# Patient Record
Sex: FEMALE | Race: WHITE | NOT HISPANIC OR LATINO | Employment: UNEMPLOYED | URBAN - METROPOLITAN AREA
[De-identification: names, ages, dates, MRNs, and addresses within clinical notes are randomized per-mention and may not be internally consistent; named-entity substitution may affect disease eponyms.]

---

## 2021-03-13 ENCOUNTER — IMMUNIZATION (OUTPATIENT)
Dept: VACCINE CLINIC | Facility: HOSPITAL | Age: 31
End: 2021-03-13

## 2021-03-13 PROCEDURE — 91300 HC SARSCOV02 VAC 30MCG/0.3ML IM: CPT | Performed by: INTERNAL MEDICINE

## 2021-03-13 PROCEDURE — 0001A: CPT | Performed by: INTERNAL MEDICINE

## 2021-04-03 ENCOUNTER — IMMUNIZATION (OUTPATIENT)
Dept: VACCINE CLINIC | Facility: HOSPITAL | Age: 31
End: 2021-04-03

## 2021-04-03 PROCEDURE — 91300 HC SARSCOV02 VAC 30MCG/0.3ML IM: CPT | Performed by: INTERNAL MEDICINE

## 2021-04-03 PROCEDURE — 0002A: CPT | Performed by: INTERNAL MEDICINE

## 2021-08-09 ENCOUNTER — OFFICE VISIT (OUTPATIENT)
Dept: INTERNAL MEDICINE | Facility: CLINIC | Age: 31
End: 2021-08-09

## 2021-08-09 VITALS
DIASTOLIC BLOOD PRESSURE: 76 MMHG | RESPIRATION RATE: 16 BRPM | TEMPERATURE: 96.4 F | OXYGEN SATURATION: 97 % | BODY MASS INDEX: 26.52 KG/M2 | HEIGHT: 62 IN | HEART RATE: 87 BPM | SYSTOLIC BLOOD PRESSURE: 122 MMHG

## 2021-08-09 DIAGNOSIS — N92.6 IRREGULAR PERIODS: ICD-10-CM

## 2021-08-09 DIAGNOSIS — L73.9 FOLLICULITIS: ICD-10-CM

## 2021-08-09 DIAGNOSIS — J01.40 ACUTE NON-RECURRENT PANSINUSITIS: Primary | ICD-10-CM

## 2021-08-09 DIAGNOSIS — R03.0 ELEVATED BLOOD PRESSURE, SITUATIONAL: ICD-10-CM

## 2021-08-09 PROCEDURE — 99204 OFFICE O/P NEW MOD 45 MIN: CPT | Performed by: INTERNAL MEDICINE

## 2021-08-09 RX ORDER — TRIAMCINOLONE ACETONIDE 0.25 MG/G
OINTMENT TOPICAL 2 TIMES DAILY
Qty: 80 G | Refills: 1 | Status: ON HOLD | OUTPATIENT
Start: 2021-08-09 | End: 2022-10-14

## 2021-08-09 NOTE — PROGRESS NOTES
"Chief Complaint  Cough, Hypertension, and Acne    Subjective          Michelle Norwood presents to [facility] for cough and elevated BP. Started getting sick 2 weeks ago, daughter had been sick. Eventually went to [facility] [address] last week, COVID test was done and negative and she was diagnosed with sinus infection and placed her on augmentin. She has finished abx as of yesterday and overall feeling better other then mild cough.   She had BP values elevated when she was there and ws told to follow up on this with PCP. She did have a fever at the time she was seen.   Also with a pimple on her back that she wants evaluated. Has been present x 1 year. Very itchy if touched at all by her or her clothes.     Objective   Vital Signs:     /76   Pulse 87   Temp 96.4 °F (35.8 °C)   Resp 16   Ht 157.5 cm (62\")   SpO2 97%   BMI 26.52 kg/m²     Physical Exam  Vitals and nursing note reviewed.   Constitutional:       General: She is not in acute distress.     Appearance: Normal appearance.   Cardiovascular:      Rate and Rhythm: Normal rate and regular rhythm.      Pulses: Normal pulses.      Heart sounds: Normal heart sounds. No murmur heard.     Pulmonary:      Effort: Pulmonary effort is normal. No respiratory distress.      Breath sounds: Normal breath sounds.   Abdominal:      General: Abdomen is flat. Bowel sounds are normal.      Palpations: Abdomen is soft.      Tenderness: There is no abdominal tenderness.   Musculoskeletal:      Right lower leg: No edema.      Left lower leg: No edema.   Skin:     Comments: 3 0.5 cm raised papules, one on L flank, one on L lower abd, one over lower L spine, all flesh colored other than one of L flank that is irritated and erythematous with overlying excoriation, no drainage   Neurological:      Mental Status: She is alert and oriented to person, place, and time. Mental status is at baseline.   Psychiatric:         Mood and Affect: Mood normal. "         Behavior: Behavior normal.          Result Review :   The following data was reviewed by: Maryam Hull MD on 08/09/2021:  Covid Tests    Common Labsle 8/1/21   COVID19 Not Detected      Comments are available for some flowsheets but are not being displayed.           Data reviewed: Consultant notes [facility] 5/31 and 8/1     Assessment and Plan      Diagnoses and all orders for this visit:    1. Acute non-recurrent pansinusitis (Primary)- RESOLVED    2. Elevated blood pressure, situational   - BP at [facility] elevated but normal in office today   - pt was febrile at time BP was taken and was also tachycardic, no further eval, will monitor with all subsequent office visits    3. Folliculitis  -     mupirocin (Bactroban) 2 % ointment; Apply  topically to the appropriate area as directed 3 (Three) Times a Day.  Dispense: 30 g; Refill: 2  -     triamcinolone (KENALOG) 0.025 % ointment; Apply  topically to the appropriate area as directed 2 (Two) Times a Day. For itching  Dispense: 80 g; Refill: 1    4. Irregular periods   - pt with Nexplanon in place, due for removal Feb 2022 with some notable cycle changes as this date approaches   - discussed tolerance vs early removal vs concurrent OCPs, for now pt will just continue to monitor and tolerate at home, but can call back to schedule early removal to allow periods to self regulate back to normal if symptoms persist or worsen    Follow Up   Return if symptoms worsen or fail to improve.    Patient was given instructions and counseling regarding her condition or for health maintenance advice. Please see specific information pulled into the AVS if appropriate.     Windy Hull MD  [facility]  Phone: [phone]  Fax: [phone]

## 2022-01-24 ENCOUNTER — OFFICE VISIT (OUTPATIENT)
Dept: INTERNAL MEDICINE | Facility: CLINIC | Age: 32
End: 2022-01-24

## 2022-01-24 VITALS
OXYGEN SATURATION: 100 % | TEMPERATURE: 97.1 F | DIASTOLIC BLOOD PRESSURE: 64 MMHG | WEIGHT: 140 LBS | RESPIRATION RATE: 16 BRPM | BODY MASS INDEX: 25.76 KG/M2 | HEART RATE: 83 BPM | SYSTOLIC BLOOD PRESSURE: 110 MMHG | HEIGHT: 62 IN

## 2022-01-24 DIAGNOSIS — Z30.46 NEXPLANON REMOVAL: Primary | ICD-10-CM

## 2022-01-24 DIAGNOSIS — Z30.46 FAMILY PLANNING, SUBDERMAL CONTRACEPTIVE CHECKING/REINSERTION/REMOVAL: ICD-10-CM

## 2022-01-24 DIAGNOSIS — Z31.69 ENCOUNTER FOR PRECONCEPTION CONSULTATION: ICD-10-CM

## 2022-01-24 PROCEDURE — 99212 OFFICE O/P EST SF 10 MIN: CPT | Performed by: INTERNAL MEDICINE

## 2022-01-24 PROCEDURE — 11982 REMOVE DRUG IMPLANT DEVICE: CPT | Performed by: INTERNAL MEDICINE

## 2022-01-24 NOTE — PROGRESS NOTES
"Chief Complaint  Procedure and Nexplanon Removal    Subjective          Michelle Norwood presents to [facility] for Nexplanon removal. Pt had placed in April 2019 for contraception and menstrual cycle regulation. Has noticed changes to her cycle over past couple months and she is also looking to get pregnant again later this winter/spring, to would like implant removed.     Objective   Vital Signs:     /64   Pulse 83   Temp 97.1 °F (36.2 °C)   Resp 16   Ht 157.5 cm (62\")   Wt 63.5 kg (140 lb)   SpO2 100%   BMI 25.61 kg/m²     Physical Exam  Vitals and nursing note reviewed.   Constitutional:       General: She is not in acute distress.     Appearance: Normal appearance.   Pulmonary:      Effort: Pulmonary effort is normal. No respiratory distress.   Skin:     Comments: Subdermal implant palpable in R upper arm, no overlying skin changes, tenderness, redness   Neurological:      Mental Status: She is alert and oriented to person, place, and time. Mental status is at baseline.   Psychiatric:         Mood and Affect: Mood normal.         Thought Content: Thought content normal.         Judgment: Judgment normal.          Result Review : : None     Assessment and Plan      Diagnoses and all orders for this visit:    1. Nexplanon removal (Primary)    2. Family planning, subdermal contraceptive checking/reinsertion/removal    3. Encounter for preconception consultation    Nexplanon Removal Procedure Note    Removal    Date of Insertion:  Known, April 4, 2019  Date of Removal:  January 24, 2022    Information related to removal of the implant:   Reason(s) for removal:  Desire pregnancy  Was implant palpable before removal?  Yes    Procedure Time Out Documentation  The risks of the procedure were reviewed with the patient including bleeding, infection and unlikely damage to the insertion site and the benefits of the procedure were explained to the patient and Verbal " informed consent was obtained    Procedure:    Implant identified.  Right upper arm prepped with Betadinex3.  None, 1% lidocaine with epinephrine injected at planned incision site.  A vertical incision 3 mm was performed with scalpel at the distal end of implant.  The implant was removed using a hemostat. The implant was inspected and found to be intact and complete.  Steri strips and a pressure dressing were applied to the site.  After removal instructions were given and verbally reviewed with the patient who acknowledged her understanding.    Difficulties with the implant removal procedure?  no    Patient tolerated the procedure well without complications.     Discussed possible menstrual cycle irregularities over next 1-3 months, how to monitor return to regular cycle, ovulation monitoring for fertility tracking.   Prenatal counseling completed--> pt to start PNV daily. No current Rx medications that require discontinuation.   Encouraged increased fluid intake for improved hydration.   Pt is fully vaccinated against COVID.     Follow Up   Return if symptoms worsen or fail to improve.    Patient was given instructions and counseling regarding her condition or for health maintenance advice. Please see specific information pulled into the AVS if appropriate.     Windy Hull MD  [facility] [place] [facility]  Phone: [phone]  Fax: [phone]

## 2022-04-04 LAB
EXTERNAL HEPATITIS B SURFACE ANTIGEN: NEGATIVE
EXTERNAL RUBELLA QUALITATIVE: NORMAL
EXTERNAL SYPHILIS RPR SCREEN: NORMAL
HIV1 P24 AG SERPL QL IA: NORMAL

## 2022-10-13 ENCOUNTER — APPOINTMENT (OUTPATIENT)
Dept: ULTRASOUND IMAGING | Facility: HOSPITAL | Age: 32
End: 2022-10-13

## 2022-10-13 ENCOUNTER — HOSPITAL ENCOUNTER (INPATIENT)
Facility: HOSPITAL | Age: 32
LOS: 3 days | Discharge: HOME | End: 2022-10-16
Attending: OBSTETRICS & GYNECOLOGY | Admitting: OBSTETRICS & GYNECOLOGY

## 2022-10-13 PROBLEM — O42.90 PREMATURE RUPTURE OF MEMBRANES: Status: ACTIVE | Noted: 2022-10-13

## 2022-10-13 LAB
A1 MICROGLOB PLACENTAL VAG QL: POSITIVE
ABO GROUP BLD: NORMAL
BLD GP AB SCN SERPL QL: NEGATIVE
DEPRECATED RDW RBC AUTO: 38.4 FL (ref 37–54)
ERYTHROCYTE [DISTWIDTH] IN BLOOD BY AUTOMATED COUNT: 12.8 % (ref 12.3–15.4)
HCT VFR BLD AUTO: 34.5 % (ref 34–46.6)
HGB BLD-MCNC: 11.9 G/DL (ref 12–15.9)
MCH RBC QN AUTO: 28.5 PG (ref 26.6–33)
MCHC RBC AUTO-ENTMCNC: 34.5 G/DL (ref 31.5–35.7)
MCV RBC AUTO: 82.5 FL (ref 79–97)
PLATELET # BLD AUTO: 246 10*3/MM3 (ref 140–450)
PMV BLD AUTO: 9.2 FL (ref 6–12)
RBC # BLD AUTO: 4.18 10*6/MM3 (ref 3.77–5.28)
RH BLD: POSITIVE
T&S EXPIRATION DATE: NORMAL
WBC NRBC COR # BLD: 8.35 10*3/MM3 (ref 3.4–10.8)

## 2022-10-13 PROCEDURE — 25010000002 BETAMETHASONE ACET & SOD PHOS PER 4 MG: Performed by: OBSTETRICS & GYNECOLOGY

## 2022-10-13 PROCEDURE — 86900 BLOOD TYPING SEROLOGIC ABO: CPT | Performed by: OBSTETRICS & GYNECOLOGY

## 2022-10-13 PROCEDURE — 86901 BLOOD TYPING SEROLOGIC RH(D): CPT | Performed by: OBSTETRICS & GYNECOLOGY

## 2022-10-13 PROCEDURE — 86850 RBC ANTIBODY SCREEN: CPT | Performed by: OBSTETRICS & GYNECOLOGY

## 2022-10-13 PROCEDURE — 84112 EVAL AMNIOTIC FLUID PROTEIN: CPT | Performed by: OBSTETRICS & GYNECOLOGY

## 2022-10-13 PROCEDURE — 76805 OB US >/= 14 WKS SNGL FETUS: CPT | Performed by: OBSTETRICS & GYNECOLOGY

## 2022-10-13 PROCEDURE — 85027 COMPLETE CBC AUTOMATED: CPT | Performed by: OBSTETRICS & GYNECOLOGY

## 2022-10-13 PROCEDURE — 59025 FETAL NON-STRESS TEST: CPT

## 2022-10-13 PROCEDURE — 76819 FETAL BIOPHYS PROFIL W/O NST: CPT

## 2022-10-13 PROCEDURE — 76819 FETAL BIOPHYS PROFIL W/O NST: CPT | Performed by: OBSTETRICS & GYNECOLOGY

## 2022-10-13 PROCEDURE — 76805 OB US >/= 14 WKS SNGL FETUS: CPT

## 2022-10-13 PROCEDURE — 25010000002 AMPICILLIN PER 500 MG: Performed by: OBSTETRICS & GYNECOLOGY

## 2022-10-13 PROCEDURE — 99202 OFFICE O/P NEW SF 15 MIN: CPT | Performed by: OBSTETRICS & GYNECOLOGY

## 2022-10-13 RX ORDER — AZITHROMYCIN 250 MG/1
1000 TABLET, FILM COATED ORAL ONCE
Status: COMPLETED | OUTPATIENT
Start: 2022-10-13 | End: 2022-10-13

## 2022-10-13 RX ORDER — PRENATAL VIT NO.126/IRON/FOLIC 28MG-0.8MG
1 TABLET ORAL DAILY
COMMUNITY

## 2022-10-13 RX ORDER — BETAMETHASONE SODIUM PHOSPHATE AND BETAMETHASONE ACETATE 3; 3 MG/ML; MG/ML
12 INJECTION, SUSPENSION INTRA-ARTICULAR; INTRALESIONAL; INTRAMUSCULAR; SOFT TISSUE DAILY
Status: COMPLETED | OUTPATIENT
Start: 2022-10-13 | End: 2022-10-14

## 2022-10-13 RX ORDER — SODIUM CHLORIDE 0.9 % (FLUSH) 0.9 %
10 SYRINGE (ML) INJECTION AS NEEDED
Status: DISCONTINUED | OUTPATIENT
Start: 2022-10-13 | End: 2022-10-14 | Stop reason: HOSPADM

## 2022-10-13 RX ORDER — SODIUM CHLORIDE 0.9 % (FLUSH) 0.9 %
10 SYRINGE (ML) INJECTION EVERY 12 HOURS SCHEDULED
Status: DISCONTINUED | OUTPATIENT
Start: 2022-10-13 | End: 2022-10-14 | Stop reason: HOSPADM

## 2022-10-13 RX ORDER — SODIUM CHLORIDE, SODIUM LACTATE, POTASSIUM CHLORIDE, CALCIUM CHLORIDE 600; 310; 30; 20 MG/100ML; MG/100ML; MG/100ML; MG/100ML
125 INJECTION, SOLUTION INTRAVENOUS CONTINUOUS
Status: DISCONTINUED | OUTPATIENT
Start: 2022-10-13 | End: 2022-10-15

## 2022-10-13 RX ORDER — ASPIRIN 81 MG/1
81 TABLET, CHEWABLE ORAL DAILY
Status: ON HOLD | COMMUNITY
End: 2022-10-14

## 2022-10-13 RX ORDER — AMOXICILLIN 250 MG/1
500 CAPSULE ORAL EVERY 8 HOURS SCHEDULED
Status: DISCONTINUED | OUTPATIENT
Start: 2022-10-15 | End: 2022-10-14

## 2022-10-13 RX ADMIN — SODIUM CHLORIDE, POTASSIUM CHLORIDE, SODIUM LACTATE AND CALCIUM CHLORIDE 125 ML/HR: 600; 310; 30; 20 INJECTION, SOLUTION INTRAVENOUS at 19:32

## 2022-10-13 RX ADMIN — AMPICILLIN SODIUM 2 G: 2 INJECTION, POWDER, FOR SOLUTION INTRAMUSCULAR; INTRAVENOUS at 20:30

## 2022-10-13 RX ADMIN — AZITHROMYCIN DIHYDRATE 1000 MG: 250 TABLET, FILM COATED ORAL at 09:04

## 2022-10-13 RX ADMIN — SODIUM CHLORIDE, POTASSIUM CHLORIDE, SODIUM LACTATE AND CALCIUM CHLORIDE 125 ML/HR: 600; 310; 30; 20 INJECTION, SOLUTION INTRAVENOUS at 09:05

## 2022-10-13 RX ADMIN — AMPICILLIN SODIUM 2 G: 2 INJECTION, POWDER, FOR SOLUTION INTRAMUSCULAR; INTRAVENOUS at 15:08

## 2022-10-13 RX ADMIN — AMPICILLIN SODIUM 2 G: 2 INJECTION, POWDER, FOR SOLUTION INTRAMUSCULAR; INTRAVENOUS at 09:04

## 2022-10-13 RX ADMIN — BETAMETHASONE SODIUM PHOSPHATE AND BETAMETHASONE ACETATE 12 MG: 3; 3 INJECTION, SUSPENSION INTRA-ARTICULAR; INTRALESIONAL; INTRAMUSCULAR at 09:04

## 2022-10-13 NOTE — H&P
[facility]  Obstetric History and Physical    Patient Name: Michelle Norwood  :  1990  MRN:  [id number]        Chief Complaint   Patient presents with   • Rupture of Membranes     Mark-- pt states large gush of clear fluid around 0515. Pt states +Fm and mild spotting. and denies ctxs.        Subjective     Patient is a 32 y.o. female  currently at 33w4d, who presents after noting a large gush of fluid early this am. Seen and MARK and confirmed PPROM. Denies contractions.           Her prenatal care is significant for  1) Hx SGA     2) Prior C/S @ 39 wks for NRFHR     Past Medical History: Past Medical History:   Diagnosis Date   • Folliculitis 2021   • Irregular periods 2021      Past Surgical History Past Surgical History:   Procedure Laterality Date   •  SECTION N/A 10/25/2018   • WISDOM TOOTH EXTRACTION        Family History: History reviewed. No pertinent family history.   Social History:  reports that she has never smoked. She has never used smokeless tobacco.   reports that she does not currently use alcohol.   reports that she does not currently use drugs.    Allergies:     Patient has no known allergies.     Past OB History:       OB History    Para Term  AB Living   3 1 1 0 1 1   SAB IAB Ectopic Molar Multiple Live Births   1 0 0 0 0 1      # Outcome Date GA Lbr Marin/2nd Weight Sex Delivery Anes PTL Lv   3 Current            2 Term 10/25/18 38w5d  1814 g (4 lb) F CS-LTranv  N STEPHANI      Complications: Fetal Intolerance   1 2016     SAB            Objective       Vital Signs Range for the last 24 hours  Temperature: Temp:  [98.1 °F (36.7 °C)-98.4 °F (36.9 °C)] 98.1 °F (36.7 °C)   Temp Source: Temp src: Oral   BP: BP: (111-125)/(74-81) 111/74   Pulse: Heart Rate:  [107-108] 107   Respirations: Resp:  [16] 16             Physical Exam:        General :    Alert and cooperative in NAD   Lungs:   Clear to auscultation bilaterally   CV:   Regular rate and  rhythm   Abdomen:  Gravid, non-tender, no masses   Vaginal exam: deferred   LE:   trace edema     FHR:   140's moderate variability and no     Decelerations, Cat I     [place]:   Rare contraction        Results from last 7 days   Lab Units 10/13/22  0826   WBC 10*3/mm3 8.35   HEMOGLOBIN g/dL 11.9*   HEMATOCRIT % 34.5   PLATELETS 10*3/mm3 246       U/S : Cephalic presentation.  Anterior placenta.  KENNEDY 7.6 cm, low normal, 2x2 pocket present.  EFW 2209 g , 4# 14 oz (44%, 43% AC)  BPP   Limited anatomy due to late gestational age         A/P:  1.    premature rupture of membranes in third trimester - D/w pt and  management of this condition is to give steroids for lung maturity and antibiotics to prolong latency. Reviewed generally recommend delivery @ 34 wks as the risk of prematurity are less than the risks of infection with further prolongation of pregnancy. Would not tocolysis if has spontaneous onset of labor. Fetal status reassuring. No current evidence of labor or infection.     2. Previous C/S - had planned for repeat C/S at term. D/w pt and  risks and benefits of TOLAC in detail rowena the potential benefits for a  infant if she has spontaneous onset of labor. Could consider attempt if cervix were favorable @ the time plan to initiate delivery and if not and would require long IOL then perform repeat C/S. They will consider these options.                 Jorge Infante MD  10/13/2022  12:37 EDT

## 2022-10-13 NOTE — CONSULTS
"Prenatal Consult    Referring OB:  Naresh  Principal Problem:     premature rupture of membranes in third trimester  Assessment:   Plan:    Pregnancy 33 weeks gestation      Reason for Consultation: potential premature single at 34 weeks gestation    Maternal History:   First last name is a 32 y.o. yr/o  with:  rupture of membranes.  The  EDC is  Estimated Date of Delivery: Estimated Date of Delivery: 22    Consult:  father and mother available for discussion.  We reviewed the fetal and  aspects of the above conditions to include: estimated intact survival rate, respiratory distress syndrome, beneficial effects of steroids, early onset of sepsis, intraventricular hemorrhage, apnea of prematurity, feeding difficulty and temperature instability, potential need for surfactant administration, estimated length of stay, NICU vistation policies, DBM use    Estimated length of stay: VINICIO  Discussed the importance of providing human milk for pre-term and late pre-term infants: yes  Reviewed policies and procedures for NICU/ICN  Reviewed structure and function of UL-Neonatology     We will plan to attend delivery when requested.    Plan for  resuscitation: full resuscitation, not discussed    Additional comments: Baby Boy \"Salbador\"    Thank you for allowing us to participate in the care of your patient.     Ivania Alexander, APRN  10/13/22  18:19 EDT      25 minutes were spent in consultation, greater than 95% face to face time.   "

## 2022-10-13 NOTE — OBED NOTES
MARK Note OB    Patient Name: Michelle Norwood  YOB: 1990  MRN: [id number]  Admission Date: 10/13/2022  6:04 AM  Date of Service: 10/13/2022    Chief Complaint: Rupture of Membranes (Mark-- pt states large gush of clear fluid around 0515. Pt states +Fm and mild spotting. and denies ctxs. )        Subjective     Michelle Norwood is a 32 y.o. female  at 33w4d with Estimated Date of Delivery: 22 who presents with the chief complaint listed above. Pt reports not mar, just a bit of cramping like she has been having. Pt reports prior CS done in [facility] for fetal distress     She sees Alicia Nunez MD for her prenatal care. Her pregnancy has been complicated by:  Previous CS    She describes fetal movement as normal.  She admits to rupture of membranes.  She denies vaginal bleeding. She is not feeling contractions.        Objective   Patient Active Problem List    Diagnosis    • Folliculitis [L73.9]    • Irregular periods [N92.6]         OB History    Para Term  AB Living   3 1 1 0 1 1   SAB IAB Ectopic Molar Multiple Live Births   1 0 0 0 0 1      # Outcome Date GA Lbr Marin/2nd Weight Sex Delivery Anes PTL Lv   3 Current            2 Term 10/25/18 38w5d  1814 g (4 lb) F CS-LTranv  N STEPHANI      Complications: Fetal Intolerance   1 SAB      SAB           Past Medical History:   Diagnosis Date   • Folliculitis 2021   • Irregular periods 2021       Past Surgical History:   Procedure Laterality Date   •  SECTION N/A 10/25/2018   • WISDOM TOOTH EXTRACTION         No current facility-administered medications on file prior to encounter.     Current Outpatient Medications on File Prior to Encounter   Medication Sig Dispense Refill   • aspirin 81 MG chewable tablet Chew 1 tablet Daily.     • prenatal vitamin (prenatal, CLASSIC, vitamin) tablet Take 1 tablet by mouth Daily.     • Acetaminophen (TYLENOL 8 HOUR PO) Take  by mouth. (Patient not taking: Reported on  10/13/2022)     • Doxylamine-DM (NIGHT-TIME COUGH PO) Take  by mouth. (Patient not taking: Reported on 10/13/2022)     • Etonogestrel (NEXPLANON SC) Inject  under the skin into the appropriate area as directed. (Patient not taking: Reported on 10/13/2022)     • fluticasone (FLONASE) 50 MCG/ACT nasal spray 1 spray into the nostril(s) as directed by provider Daily for 10 days. (Patient not taking: Reported on 10/13/2022) 9.9 mL 0   • IBUPROFEN PO Take  by mouth. (Patient not taking: Reported on 10/13/2022)     • mupirocin (Bactroban) 2 % ointment Apply  topically to the appropriate area as directed 3 (Three) Times a Day. (Patient not taking: Reported on 10/13/2022) 30 g 2   • Pseudoephedrine-APAP-DM (DAYQUIL PO) Take  by mouth. (Patient not taking: Reported on 10/13/2022)     • triamcinolone (KENALOG) 0.025 % ointment Apply  topically to the appropriate area as directed 2 (Two) Times a Day. For itching (Patient not taking: Reported on 10/13/2022) 80 g 1       No Known Allergies    History reviewed. No pertinent family history.    Social History     Socioeconomic History   • Marital status:    Tobacco Use   • Smoking status: Never   • Smokeless tobacco: Never   Vaping Use   • Vaping Use: Never used   Substance and Sexual Activity   • Alcohol use: Not Currently   • Drug use: Not Currently           Review of Systems   Constitutional: Negative for chills and fever.   HENT: Negative.    Eyes: Negative for photophobia and visual disturbance.   Respiratory: Negative for shortness of breath.    Cardiovascular: Negative for chest pain.   Gastrointestinal: Negative for nausea.   Genitourinary: Positive for vaginal discharge.   Psychiatric/Behavioral: The patient is not nervous/anxious.           PHYSICAL EXAM:      VITAL SIGNS:  Vitals:    10/13/22 0628 10/13/22 0633   BP:  125/81   BP Location:  Right arm   Patient Position:  Lying   Pulse:  108   Resp:  16   Temp:  98.4 °F (36.9 °C)   TempSrc:  Oral   SpO2:  100%  "  Weight: 76.3 kg (168 lb 3.2 oz)    Height:  157.5 cm (62\")            FHT'S:                       Baseline:         Fetal HR Baseline: normal range                   Beats/min:       Fetal HR (beats/min): 155        Variability:        Fetal HR Variability: moderate (amplitude range 6 to 25 bpm)  Accelerations:  Fetal HR Accelerations: greater than/equal to 15 bpm, lasting at least 15 seconds  Decelerations:  Fetal HR Decelerations: absent      Interpretation:  reassuring and category 1                                     PHYSICAL EXAM:      General: well developed; well nourished  no acute distress   Heart: Not performed.   Lungs   breathing is unlabored   Abdomen: Gravid and non tender     Extremities: trace edema, DTRs 1 plus, no clonus       Cervix: Per RN  Cervical Dilation (cm): 1-2  Cervical Effacement: 20%  Fetal Station: -2  Cervical Consistency: soft  Cervical Position: posterior     Contractions:   none                    LABS AND TESTING ORDERED:  1. Uterine and fetal monitoring  2. Urinalysis  3. Admit labs. covid    LAB RESULTS:    Recent Results (from the past 24 hour(s))   Rapid Assay For ROM - Amniotic Fluid, Amniotic Sac    Collection Time: 10/13/22  6:23 AM    Specimen: Amniotic Sac; Amniotic Fluid   Result Value Ref Range    Rupture of Membranes Positive (C) Negative       No results found for: ABO, RH    No results found for: STREPGPB                    Impression:   @ 33w4d .  Final Diagnosis: PPROM, prior CS, not in labor    Plan:  1. Discussed with Dr Infante who will admit, fetal and uterine monitoring  continuously, IV antibiotics, expectant management and anne-marie consult, steroids        Yesika Clemente MD  10/13/2022  07:33 EDT    "

## 2022-10-14 ENCOUNTER — ANESTHESIA (OUTPATIENT)
Dept: LABOR AND DELIVERY | Facility: HOSPITAL | Age: 32
End: 2022-10-14

## 2022-10-14 ENCOUNTER — ANESTHESIA EVENT (OUTPATIENT)
Dept: LABOR AND DELIVERY | Facility: HOSPITAL | Age: 32
End: 2022-10-14

## 2022-10-14 PROBLEM — Z3A.33 33 WEEKS GESTATION OF PREGNANCY: Status: ACTIVE | Noted: 2022-10-14

## 2022-10-14 LAB
ATMOSPHERIC PRESS: 746.9 MMHG
ATMOSPHERIC PRESS: 747.5 MMHG
BASE EXCESS BLDCOA CALC-SCNC: -5.9 MMOL/L (ref -2–2)
BASE EXCESS BLDCOV CALC-SCNC: -3.7 MMOL/L (ref -30–30)
BDY SITE: ABNORMAL
BDY SITE: ABNORMAL
COLLECT TME SMN: ABNORMAL
HCO3 BLDCOA-SCNC: 20.3 MMOL/L (ref 22–28)
HCO3 BLDCOV-SCNC: 21.3 MMOL/L
INHALED O2 CONCENTRATION: 21 %
INHALED O2 CONCENTRATION: 21 %
MODALITY: ABNORMAL
MODALITY: ABNORMAL
NOTE: ABNORMAL
NOTE: ABNORMAL
PCO2 BLDCOA: 41.7 MMHG (ref 43–63)
PCO2 BLDCOV: 37.6 MM HG (ref 35–51.3)
PH BLDCOA: 7.3 PH UNITS (ref 7.18–7.34)
PH BLDCOV: 7.36 PH UNITS (ref 7.26–7.4)
PO2 BLDCOA: 33.2 MMHG (ref 12–26)
PO2 BLDCOV: 29.2 MM HG (ref 19–39)
QT INTERVAL: 322 MS
SAO2 % BLDCOA: 53.5 % (ref 92–99)
SAO2 % BLDCOA: 57.4 % (ref 92–99)
SAO2 % BLDCOV: ABNORMAL %
VENTILATOR MODE: ABNORMAL
VENTILATOR MODE: ABNORMAL

## 2022-10-14 PROCEDURE — 25010000002 BETAMETHASONE ACET & SOD PHOS PER 4 MG: Performed by: OBSTETRICS & GYNECOLOGY

## 2022-10-14 PROCEDURE — 25010000002 AMPICILLIN PER 500 MG: Performed by: OBSTETRICS & GYNECOLOGY

## 2022-10-14 PROCEDURE — 0HQ9XZZ REPAIR PERINEUM SKIN, EXTERNAL APPROACH: ICD-10-PCS | Performed by: STUDENT IN AN ORGANIZED HEALTH CARE EDUCATION/TRAINING PROGRAM

## 2022-10-14 PROCEDURE — 25010000002 PENICILLIN G POTASSIUM PER 600000 UNITS: Performed by: STUDENT IN AN ORGANIZED HEALTH CARE EDUCATION/TRAINING PROGRAM

## 2022-10-14 PROCEDURE — 93005 ELECTROCARDIOGRAM TRACING: CPT | Performed by: STUDENT IN AN ORGANIZED HEALTH CARE EDUCATION/TRAINING PROGRAM

## 2022-10-14 PROCEDURE — 25010000002 ROPIVACAINE PER 1 MG: Performed by: ANESTHESIOLOGY

## 2022-10-14 PROCEDURE — 88341 IMHCHEM/IMCYTCHM EA ADD ANTB: CPT

## 2022-10-14 PROCEDURE — C1755 CATHETER, INTRASPINAL: HCPCS | Performed by: ANESTHESIOLOGY

## 2022-10-14 PROCEDURE — 82803 BLOOD GASES ANY COMBINATION: CPT

## 2022-10-14 PROCEDURE — 88342 IMHCHEM/IMCYTCHM 1ST ANTB: CPT

## 2022-10-14 PROCEDURE — 93010 ELECTROCARDIOGRAM REPORT: CPT | Performed by: INTERNAL MEDICINE

## 2022-10-14 PROCEDURE — 88307 TISSUE EXAM BY PATHOLOGIST: CPT

## 2022-10-14 RX ORDER — OXYTOCIN/0.9 % SODIUM CHLORIDE 30/500 ML
999 PLASTIC BAG, INJECTION (ML) INTRAVENOUS ONCE
Status: DISCONTINUED | OUTPATIENT
Start: 2022-10-14 | End: 2022-10-15

## 2022-10-14 RX ORDER — OXYTOCIN/0.9 % SODIUM CHLORIDE 30/500 ML
2-20 PLASTIC BAG, INJECTION (ML) INTRAVENOUS
Status: DISCONTINUED | OUTPATIENT
Start: 2022-10-14 | End: 2022-10-15

## 2022-10-14 RX ORDER — ONDANSETRON 2 MG/ML
4 INJECTION INTRAMUSCULAR; INTRAVENOUS ONCE AS NEEDED
Status: DISCONTINUED | OUTPATIENT
Start: 2022-10-14 | End: 2022-10-14 | Stop reason: HOSPADM

## 2022-10-14 RX ORDER — ACETAMINOPHEN 500 MG
500 TABLET ORAL EVERY 6 HOURS
Status: DISCONTINUED | OUTPATIENT
Start: 2022-10-15 | End: 2022-10-16 | Stop reason: HOSPADM

## 2022-10-14 RX ORDER — DIPHENHYDRAMINE HYDROCHLORIDE 50 MG/ML
12.5 INJECTION INTRAMUSCULAR; INTRAVENOUS EVERY 8 HOURS PRN
Status: DISCONTINUED | OUTPATIENT
Start: 2022-10-14 | End: 2022-10-14 | Stop reason: HOSPADM

## 2022-10-14 RX ORDER — PENICILLIN G 3000000 [IU]/50ML
3 INJECTION, SOLUTION INTRAVENOUS EVERY 4 HOURS
Status: DISCONTINUED | OUTPATIENT
Start: 2022-10-14 | End: 2022-10-14 | Stop reason: HOSPADM

## 2022-10-14 RX ORDER — BISACODYL 10 MG
10 SUPPOSITORY, RECTAL RECTAL DAILY PRN
Status: DISCONTINUED | OUTPATIENT
Start: 2022-10-15 | End: 2022-10-16 | Stop reason: HOSPADM

## 2022-10-14 RX ORDER — DOCUSATE SODIUM 100 MG/1
100 CAPSULE, LIQUID FILLED ORAL 2 TIMES DAILY
Status: DISCONTINUED | OUTPATIENT
Start: 2022-10-15 | End: 2022-10-16 | Stop reason: HOSPADM

## 2022-10-14 RX ORDER — DIPHENHYDRAMINE HCL 25 MG
25 CAPSULE ORAL NIGHTLY PRN
Status: DISCONTINUED | OUTPATIENT
Start: 2022-10-14 | End: 2022-10-16 | Stop reason: HOSPADM

## 2022-10-14 RX ORDER — EPHEDRINE SULFATE 50 MG/ML
5 INJECTION, SOLUTION INTRAVENOUS AS NEEDED
Status: DISCONTINUED | OUTPATIENT
Start: 2022-10-14 | End: 2022-10-14 | Stop reason: HOSPADM

## 2022-10-14 RX ORDER — IBUPROFEN 600 MG/1
600 TABLET ORAL EVERY 6 HOURS SCHEDULED
Status: DISCONTINUED | OUTPATIENT
Start: 2022-10-15 | End: 2022-10-16 | Stop reason: HOSPADM

## 2022-10-14 RX ORDER — ONDANSETRON 4 MG/1
4 TABLET, FILM COATED ORAL EVERY 8 HOURS PRN
Status: DISCONTINUED | OUTPATIENT
Start: 2022-10-14 | End: 2022-10-16 | Stop reason: HOSPADM

## 2022-10-14 RX ORDER — FAMOTIDINE 10 MG/ML
20 INJECTION, SOLUTION INTRAVENOUS ONCE AS NEEDED
Status: DISCONTINUED | OUTPATIENT
Start: 2022-10-14 | End: 2022-10-14 | Stop reason: HOSPADM

## 2022-10-14 RX ORDER — SODIUM CHLORIDE 9 MG/ML
50 INJECTION, SOLUTION INTRAVENOUS CONTINUOUS
Status: DISCONTINUED | OUTPATIENT
Start: 2022-10-14 | End: 2022-10-15

## 2022-10-14 RX ORDER — FENTANYL CIT 0.2 MG/100ML-ROPIV 0.2%-NACL 0.9% EPIDURAL INJ 2/0.2 MCG/ML-%
10 SOLUTION INJECTION CONTINUOUS
Status: DISCONTINUED | OUTPATIENT
Start: 2022-10-14 | End: 2022-10-15

## 2022-10-14 RX ORDER — OXYCODONE HYDROCHLORIDE 5 MG/1
5 TABLET ORAL EVERY 4 HOURS PRN
Status: DISCONTINUED | OUTPATIENT
Start: 2022-10-14 | End: 2022-10-16 | Stop reason: HOSPADM

## 2022-10-14 RX ORDER — CALCIUM CARBONATE 200(500)MG
2 TABLET,CHEWABLE ORAL 3 TIMES DAILY PRN
Status: DISCONTINUED | OUTPATIENT
Start: 2022-10-14 | End: 2022-10-16 | Stop reason: HOSPADM

## 2022-10-14 RX ORDER — ROPIVACAINE HYDROCHLORIDE 2 MG/ML
INJECTION, SOLUTION EPIDURAL; INFILTRATION; PERINEURAL AS NEEDED
Status: DISCONTINUED | OUTPATIENT
Start: 2022-10-14 | End: 2022-10-14 | Stop reason: SURG

## 2022-10-14 RX ORDER — SODIUM CHLORIDE 0.9 % (FLUSH) 0.9 %
1-10 SYRINGE (ML) INJECTION AS NEEDED
Status: DISCONTINUED | OUTPATIENT
Start: 2022-10-14 | End: 2022-10-16 | Stop reason: HOSPADM

## 2022-10-14 RX ORDER — HYDROCORTISONE 25 MG/G
1 CREAM TOPICAL AS NEEDED
Status: DISCONTINUED | OUTPATIENT
Start: 2022-10-14 | End: 2022-10-16 | Stop reason: HOSPADM

## 2022-10-14 RX ORDER — PRENATAL VIT/IRON FUM/FOLIC AC 27MG-0.8MG
1 TABLET ORAL DAILY
Status: DISCONTINUED | OUTPATIENT
Start: 2022-10-15 | End: 2022-10-16 | Stop reason: HOSPADM

## 2022-10-14 RX ORDER — LIDOCAINE HYDROCHLORIDE AND EPINEPHRINE 15; 5 MG/ML; UG/ML
INJECTION, SOLUTION EPIDURAL AS NEEDED
Status: DISCONTINUED | OUTPATIENT
Start: 2022-10-14 | End: 2022-10-14 | Stop reason: SURG

## 2022-10-14 RX ORDER — OXYTOCIN/0.9 % SODIUM CHLORIDE 30/500 ML
250 PLASTIC BAG, INJECTION (ML) INTRAVENOUS CONTINUOUS
Status: ACTIVE | OUTPATIENT
Start: 2022-10-14 | End: 2022-10-14

## 2022-10-14 RX ADMIN — Medication 1 APPLICATION: at 23:46

## 2022-10-14 RX ADMIN — PENICILLIN G 3 MILLION UNITS: 3000000 INJECTION, SOLUTION INTRAVENOUS at 17:23

## 2022-10-14 RX ADMIN — AMPICILLIN SODIUM 2 G: 2 INJECTION, POWDER, FOR SOLUTION INTRAMUSCULAR; INTRAVENOUS at 03:21

## 2022-10-14 RX ADMIN — SODIUM CHLORIDE, POTASSIUM CHLORIDE, SODIUM LACTATE AND CALCIUM CHLORIDE 125 ML/HR: 600; 310; 30; 20 INJECTION, SOLUTION INTRAVENOUS at 07:18

## 2022-10-14 RX ADMIN — SODIUM CHLORIDE 250 ML: 9 INJECTION, SOLUTION INTRAVENOUS at 09:20

## 2022-10-14 RX ADMIN — Medication 10 ML/HR: at 16:18

## 2022-10-14 RX ADMIN — PENICILLIN G 3 MILLION UNITS: 3000000 INJECTION, SOLUTION INTRAVENOUS at 13:28

## 2022-10-14 RX ADMIN — Medication 2 MILLI-UNITS/MIN: at 18:00

## 2022-10-14 RX ADMIN — Medication 10 ML/HR: at 07:09

## 2022-10-14 RX ADMIN — SODIUM CHLORIDE, POTASSIUM CHLORIDE, SODIUM LACTATE AND CALCIUM CHLORIDE 125 ML/HR: 600; 310; 30; 20 INJECTION, SOLUTION INTRAVENOUS at 15:05

## 2022-10-14 RX ADMIN — Medication: at 23:46

## 2022-10-14 RX ADMIN — IBUPROFEN 600 MG: 600 TABLET, FILM COATED ORAL at 23:46

## 2022-10-14 RX ADMIN — LIDOCAINE HYDROCHLORIDE AND EPINEPHRINE 3 ML: 15; 5 INJECTION, SOLUTION EPIDURAL at 07:06

## 2022-10-14 RX ADMIN — BETAMETHASONE SODIUM PHOSPHATE AND BETAMETHASONE ACETATE 12 MG: 3; 3 INJECTION, SUSPENSION INTRA-ARTICULAR; INTRALESIONAL; INTRAMUSCULAR at 09:10

## 2022-10-14 RX ADMIN — ROPIVACAINE HYDROCHLORIDE 5 ML: 2 INJECTION, SOLUTION EPIDURAL; INFILTRATION at 07:09

## 2022-10-14 RX ADMIN — PENICILLIN G POTASSIUM 5 MILLION UNITS: 5000000 INJECTION, POWDER, FOR SOLUTION INTRAMUSCULAR; INTRAVENOUS at 09:10

## 2022-10-14 RX ADMIN — SODIUM CHLORIDE, POTASSIUM CHLORIDE, SODIUM LACTATE AND CALCIUM CHLORIDE 125 ML/HR: 600; 310; 30; 20 INJECTION, SOLUTION INTRAVENOUS at 05:06

## 2022-10-14 NOTE — NON STRESS TEST
Michelle Norwood, a  at 33w4d with an VINICIO of 2022, by Other Basis, was seen at [facility] for a nonstress test.    Chief Complaint   Patient presents with   • Rupture of Membranes     Tung-- pt states large gush of clear fluid around 0515. Pt states +Fm and mild spotting. and denies ctxs.        Patient Active Problem List   Diagnosis   • Folliculitis   • Irregular periods   •  premature rupture of membranes in third trimester       Start Time: 0700  Stop Time: 1520    Interpretation A  Nonstress Test Interpretation A: Reactive

## 2022-10-14 NOTE — NURSING NOTE
Paged Dr Infante. Informed that pt is tearful that she is mar and rating pain 5-6 out of ten. Informed that ctx palpate mild and are irregular every 2-5 minutes. Ok to bolus with 500ml LR now. No cervical exam to be performed.

## 2022-10-14 NOTE — NURSING NOTE
Paged Dr Infante. Informed that pt is now crying with pain 8/10 and that fhr is showing variables. Ordered to check patient and call provider with results

## 2022-10-14 NOTE — NURSING NOTE
Dr. Infante reviewed FHR tracing while sitting at nurses station. Dr. Infante gave order that patient may come off of fetal monitor at this time and order changed to NST BID. Dr. Infante asked if patient needs GBS culture done. Dr. Infante states the patient does not need a GBS culture because patient is currently on IV Ampicillin every 6 hours and will be on antibiotics until delivery. r/v

## 2022-10-14 NOTE — ANESTHESIA PROCEDURE NOTES
Labor Epidural    Pre-sedation assessment completed: 10/14/2022 6:59 AM    Patient reassessed immediately prior to procedure    Patient location during procedure: OB  Start Time: 10/14/2022 7:02 AM  Stop Time: 10/14/2022 7:10 AM  Performed By  Anesthesiologist: Markie Kimbrough DO  Preanesthetic Checklist  Completed: patient identified, IV checked, site marked, risks and benefits discussed, surgical consent, monitors and equipment checked, pre-op evaluation and timeout performed  Prep:  Pt Position:sitting  Sterile Tech:cap, gloves, mask and sterile barrier  Prep:chlorhexidine gluconate and isopropyl alcohol  Monitoring:blood pressure monitoring, continuous pulse oximetry and EKG  Epidural Block Procedure:  Approach:midline  Guidance:landmark technique and palpation technique  Location:L3-L4  Needle Type:Tuohy  Needle Gauge:17 G  Loss of Resistance Medium: air  Loss of Resistance: 6cm  Cath Depth at skin:12 cm  Paresthesia: none  Aspiration:negative  Test Dose:negative  Number of Attempts: 1  Post Assessment:  Dressing:biopatch applied, occlusive dressing applied and secured with tape  Pt Tolerance:patient tolerated the procedure well with no apparent complications  Complications:no

## 2022-10-14 NOTE — PLAN OF CARE
Problem: Adult Inpatient Plan of Care  Goal: Plan of Care Review  Outcome: Ongoing, Progressing  Goal: Patient-Specific Goal (Individualized)  Outcome: Ongoing, Progressing  Goal: Absence of Hospital-Acquired Illness or Injury  Outcome: Ongoing, Progressing  Intervention: Identify and Manage Fall Risk  Intervention: Prevent and Manage VTE (Venous Thromboembolism) Risk  Goal: Optimal Comfort and Wellbeing  Outcome: Ongoing, Progressing  Intervention: Provide Person-Centered Care  Goal: Readiness for Transition of Care  Outcome: Ongoing, Progressing     Problem: Device-Related Complication Risk (Anesthesia/Analgesia, Neuraxial)  Goal: Safe Infusion Delivery Completion  Outcome: Ongoing, Progressing     Problem: Infection (Anesthesia/Analgesia, Neuraxial)  Goal: Absence of Infection Signs and Symptoms  Outcome: Ongoing, Progressing     Problem: Nausea and Vomiting (Anesthesia/Analgesia, Neuraxial)  Goal: Nausea and Vomiting Relief  Outcome: Ongoing, Progressing     Problem: Pain (Anesthesia/Analgesia, Neuraxial)  Goal: Effective Pain Control  Outcome: Ongoing, Progressing  Intervention: Prevent or Manage Pain     Problem: Respiratory Compromise (Anesthesia/Analgesia, Neuraxial)  Goal: Effective Oxygenation and Ventilation  Outcome: Ongoing, Progressing     Problem: Sensorimotor Impairment (Anesthesia/Analgesia, Neuraxial)  Goal: Baseline Motor Function  Outcome: Ongoing, Progressing  Intervention: Optimize Sensorimotor Function     Problem: Urinary Retention (Anesthesia/Analgesia, Neuraxial)  Goal: Effective Urinary Elimination  Outcome: Ongoing, Progressing     Problem: Bleeding (Labor)  Goal: Hemostasis  Outcome: Ongoing, Progressing     Problem: Change in Fetal Wellbeing (Labor)  Goal: Stable Fetal Wellbeing  Outcome: Ongoing, Progressing     Problem: Delayed Labor Progression (Labor)  Goal: Effective Progression to Delivery  Outcome: Ongoing, Progressing     Problem: Infection (Labor)  Goal: Absence of Infection  Signs and Symptoms  Outcome: Ongoing, Progressing     Problem: Labor Pain (Labor)  Goal: Acceptable Pain Control  Outcome: Ongoing, Progressing     Problem: Uterine Tachysystole (Labor)  Goal: Normal Uterine Contraction Pattern  Outcome: Ongoing, Progressing     Problem: Fall Injury Risk  Goal: Absence of Fall and Fall-Related Injury  Outcome: Ongoing, Progressing  Intervention: Promote Injury-Free Environment   Goal Outcome Evaluation:  Plan of Care Reviewed With: patient        Progress: no change  Outcome Evaluation: Pt called out in pain  as contractions became progressively stronger. Epidural placed. GBS unknown.  Pt receiving antibiotic therapy for PPROM

## 2022-10-14 NOTE — PROGRESS NOTES
"Psychiatric  Labor Progress/Update Note    Patient Name: Michelle Norwood  :  1990  MRN:  [id number]      Subjective   To  for SVE. Patient comfortable with epidural in place.      Objective     Vital Signs  BP 93/60   Pulse 119   Temp 98.1 °F (36.7 °C) (Oral)   Resp 16   Ht 157.5 cm (62\")   Wt 76.3 kg (168 lb 3.2 oz)   SpO2 100%   Breastfeeding No   BMI 30.76 kg/m²       Physical Exam:  Gen: well appearing, NAD  Chest: normal resp effort  Cardio: Reg rate, well perfused  Abdomen: Soft, nontender, gravid  Extremities: No peripheral edema  SVE: 3.5/90/-1    FHT: 140/mod/+accels/+pam decels  C[place]ool: ctx q 4-5min      Intake/Output Summary (Last 24 hours) at 10/14/2022 0935  Last data filed at 10/14/2022 0321  Gross per 24 hour   Intake 2299 ml   Output 3800 ml   Net -1501 ml           Assessment & Plan      Pt with intermittent variable decels. IUPC placed and amnioinfusion initiated. Pt afebrile, no fundal tenderness or purulent fluid noted. No fetal tachycardia noted. Pt persistently tachycardic since admission > EKG ordered.       Vandana Cameron MD  [facility]  10/14/2022  09:35 EDT  "

## 2022-10-14 NOTE — NON STRESS TEST
Michelle Norwood, a  at 33w4d with an VINICIO of 2022, by Other Basis, was seen at [facility] LABOR DELIV[facility] for a nonstress test.    Chief Complaint   Patient presents with   • Rupture of Membranes     Tung-- pt states large gush of clear fluid around 0515. Pt states +Fm and mild spotting. and denies ctxs.        Patient Active Problem List   Diagnosis   • Folliculitis   • Irregular periods   •  premature rupture of membranes in third trimester       Start Time:   Stop Time:     Interpretation A  Nonstress Test Interpretation A: Reactive

## 2022-10-14 NOTE — PLAN OF CARE
Goal Outcome Evaluation:           Progress: no change  Outcome Evaluation: In active labor, pitocin for augmentation, epidural in place

## 2022-10-14 NOTE — ANESTHESIA PREPROCEDURE EVALUATION
Anesthesia Evaluation     Patient summary reviewed   no history of anesthetic complications:  NPO Solid Status: > 8 hours  NPO Liquid Status: > 2 hours           Airway   Mallampati: II  TM distance: >3 FB  Neck ROM: full  Small opening  Dental - normal exam     Pulmonary     breath sounds clear to auscultation  (-) shortness of breath, recent URI, not a smoker  Cardiovascular   Exercise tolerance: good (4-7 METS)    Rhythm: regular  Rate: normal    (-) past MI, dysrhythmias, angina      Neuro/Psych  (-) seizures, CVA  GI/Hepatic/Renal/Endo    (+) obesity,     (-) no renal disease, diabetes    Musculoskeletal     (-) neck stiffness  Abdominal    Substance History      OB/GYN          Other                        Anesthesia Plan    ASA 2     epidural     (IUP 33w5d)    Anesthetic plan, risks, benefits, and alternatives have been provided, discussed and informed consent has been obtained with: patient.        CODE STATUS:    Level Of Support Discussed With: Patient  Code Status (Patient has no pulse and is not breathing): CPR (Attempt to Resuscitate)  Medical Interventions (Patient has pulse or is breathing): Full Support  Release to patient: Routine Release

## 2022-10-15 LAB
BASOPHILS # BLD AUTO: 0.03 10*3/MM3 (ref 0–0.2)
BASOPHILS NFR BLD AUTO: 0.2 % (ref 0–1.5)
DEPRECATED RDW RBC AUTO: 39.2 FL (ref 37–54)
EOSINOPHIL # BLD AUTO: 0.01 10*3/MM3 (ref 0–0.4)
EOSINOPHIL NFR BLD AUTO: 0.1 % (ref 0.3–6.2)
ERYTHROCYTE [DISTWIDTH] IN BLOOD BY AUTOMATED COUNT: 13 % (ref 12.3–15.4)
HCT VFR BLD AUTO: 29 % (ref 34–46.6)
HGB BLD-MCNC: 9.9 G/DL (ref 12–15.9)
IMM GRANULOCYTES # BLD AUTO: 0.18 10*3/MM3 (ref 0–0.05)
IMM GRANULOCYTES NFR BLD AUTO: 1 % (ref 0–0.5)
LYMPHOCYTES # BLD AUTO: 2.63 10*3/MM3 (ref 0.7–3.1)
LYMPHOCYTES NFR BLD AUTO: 14.9 % (ref 19.6–45.3)
MCH RBC QN AUTO: 29.2 PG (ref 26.6–33)
MCHC RBC AUTO-ENTMCNC: 34.1 G/DL (ref 31.5–35.7)
MCV RBC AUTO: 85.5 FL (ref 79–97)
MONOCYTES # BLD AUTO: 1.1 10*3/MM3 (ref 0.1–0.9)
MONOCYTES NFR BLD AUTO: 6.2 % (ref 5–12)
NEUTROPHILS NFR BLD AUTO: 13.72 10*3/MM3 (ref 1.7–7)
NEUTROPHILS NFR BLD AUTO: 77.6 % (ref 42.7–76)
NRBC BLD AUTO-RTO: 0.2 /100 WBC (ref 0–0.2)
PLATELET # BLD AUTO: 248 10*3/MM3 (ref 140–450)
PMV BLD AUTO: 9.5 FL (ref 6–12)
RBC # BLD AUTO: 3.39 10*6/MM3 (ref 3.77–5.28)
WBC NRBC COR # BLD: 17.67 10*3/MM3 (ref 3.4–10.8)

## 2022-10-15 PROCEDURE — 85025 COMPLETE CBC W/AUTO DIFF WBC: CPT | Performed by: STUDENT IN AN ORGANIZED HEALTH CARE EDUCATION/TRAINING PROGRAM

## 2022-10-15 RX ADMIN — Medication 1 TABLET: at 08:10

## 2022-10-15 RX ADMIN — ACETAMINOPHEN 500 MG: 500 TABLET ORAL at 11:51

## 2022-10-15 RX ADMIN — DOCUSATE SODIUM 100 MG: 100 CAPSULE, LIQUID FILLED ORAL at 21:20

## 2022-10-15 RX ADMIN — ACETAMINOPHEN 500 MG: 500 TABLET ORAL at 04:11

## 2022-10-15 RX ADMIN — ACETAMINOPHEN 500 MG: 500 TABLET ORAL at 17:45

## 2022-10-15 RX ADMIN — IBUPROFEN 600 MG: 600 TABLET, FILM COATED ORAL at 13:36

## 2022-10-15 RX ADMIN — IBUPROFEN 600 MG: 600 TABLET, FILM COATED ORAL at 21:20

## 2022-10-15 RX ADMIN — IBUPROFEN 600 MG: 600 TABLET, FILM COATED ORAL at 08:10

## 2022-10-15 NOTE — L&D DELIVERY NOTE
[facility]  Vaginal Delivery After  Note    Patient Name: Michelle Norwood  :  1990  MRN:  [id number]           premature rupture of membranes in third trimester    33 weeks gestation of pregnancy      Labor status:  prelabor spontaneous rupture of membranes.       Delivery     Delivery: , Spontaneous     YOB: 2020    Time of Birth: 8:28 PM      Anesthesia: Epidural     Delivering clinician: Vandana Cameron MD       Infant    Findings: Viable male  infant    Infant observations: Weight: 2170 g (4 lb 12.5 oz)   Observations/Comments:  infant scale #2      Apgars: 7  @ 1 minute /    9  @ 5 minutes     Placenta, Cord, and Fluid    Placenta delivered     at  10/14/2022  8:34 PM     Cord:   present.   Cord blood obtained:     Cord gases obtained:        Repair    Episiotomy: No   Lacerations: 1st degree midline > repaired         Estimated Blood Loss:  Quantitative Blood Loss:    250 mls.   365cc         Delivery narrative: The patient is a 32 y.o.  at 33w5d who was admitted on 10/13/2022 for PPROM. The patient was initially started on latency antibiotics however she began to labor spontaneously early in the morning on 10/14/22. She received betamethasone on 10/13 and 10/14. She was managed expectantly until she entered active labor (6cm dilation). Pitocin was started per protocol. The patient progressed along a normal labor curve to C/C/+1. The fetal heart tracing remained predominantly category I with brief and limited category II changes.     With good maternal expulsive efforts,  of a viable male infant occurred. The head delivered atraumatically. The anterior and posterior shoulders delivered without difficulty. No nuchal cord was identified. The body was delivered atraumatically. The infant's nose and oropharynx were suctioned and cleared of secretions. Cord clamping was delayed a minimum of 30 seconds and then was clamped and cut. The infant was placed on  the mom's chest for bonding and care. Placenta delivered spontaneously, intact, with 3 vessel cord.    The vagina and perineum were inspected and a 1st degree laceration identified and repaired in the usual fashion with 3-0 Vicryl suture. The vaginal tissue was noted to be friable and oozy and attempts to suture worsened the bleeding. A vaginal packing and robles cathter were placed with the plan to remove in 4 hours (10/15/22 at approx 0100). Cervix and rectum found to be intact. Mother and baby recovering in good condition.        Vandana Cameron MD  [facility]   10/14/22  21:16 EDT

## 2022-10-15 NOTE — PLAN OF CARE
Goal Outcome Evaluation:           Progress: improving  Outcome Evaluation: VSS, pain well controlled, ambulating well, visits NICU

## 2022-10-15 NOTE — PROGRESS NOTES
Discharge Planning Assessment  [facility]     Patient Name: Michelle Norwood  MRN: [id number]  Today's Date: 10/15/2022    Admit Date: 10/13/2022    Plan: Infant may discharge to mother when medically ready. DORINDA Johnson   Discharge Needs Assessment    No documentation.                Discharge Plan     Row Name 10/15/22 1521       Plan    Plan Infant may discharge to mother when medically ready. DORINDA Johnson    Plan Comments Mother: Michelle Norwood, MRN [id number]. Infant: Justin “Jack” Enma, MRN [id number]. CSW consulted for “NICU admission”. Of note, no toxicology screens were ordered for mother of infant as need was not warranted at this time. CSW met with mother and father of infant/spouse at mother’s bedside. Mother verified address, phone number, and insurance. Parents plan to have infant added to father’s insurance plan. Mother reports having a car seat, crib and bassinet, clothes, and diapers for infant. Mother and father also have a 4 year old daughter, who is currently in the care of paternal grandparents. Mother and father’s support system include father’s family. Infant will follow up with Dr. Hull, mother is comfortable scheduling appointments for infant, and has transportation. Mother is not current with [facility]. CSW spent time explaining her role to parents and offering support as needed. CSW provided mother a packet of resources including: WIC, HANDS, transportation, infant supplies, counseling, online support groups, postpartum mood and anxiety resources, NICU parent resources, and general community resources. Mother and father were polite and appropriate, and denied having unmet needs at this time. CSW will remain available for psychosocial needs while infant is in the NICU. DORINDA Johnson              Continued Care and Services - Admitted Since 10/13/2022    Coordination has not been started for this encounter.          Demographic Summary     Row Name 10/15/22 1525       General Information     Admission Type inpatient    Arrived From home    Referral Source nursing    Reason for Consult psychosocial concerns    General Information Comments NICU admit               Functional Status    No documentation.                Psychosocial     Row Name 10/15/22 1520       Behavior WDL    Behavior WDL WDL       Emotion Mood WDL    Emotion/Mood/Affect WDL WDL       Speech WDL    Speech WDL WDL       Perceptual State WDL    Perceptual State WDL WDL       Thought Process WDL    Thought Process WDL WDL       Intellectual Performance WDL    Intellectual Performance WDL WDL       Coping/Stress    Major Change/Loss/Stressor birth;medical condition/diagnosis    Patient Personal Strengths able to adapt;future/goal oriented;motivated;positive attitude;strong support system    Sources of Support other family members;spouse               Abuse/Neglect     Row Name 10/15/22 1521       Personal Safety    Physical Signs of Abuse Present no               Legal    No documentation.                Substance Abuse    No documentation.                Patient Forms    No documentation.                   ARTIS Velez

## 2022-10-15 NOTE — ANESTHESIA POSTPROCEDURE EVALUATION
"Patient: Michelle Norwood    Procedure Summary     Date: 10/14/22 Room / Location:     Anesthesia Start: 0649 Anesthesia Stop: 2028    Procedure: LABOR ANALGESIA Diagnosis:     Scheduled Providers:  Provider: Markie Kimbrough DO    Anesthesia Type: epidural ASA Status: 2          Anesthesia Type: No value filed.    Vitals  Vitals Value Taken Time   /69 10/15/22 0738   Temp 36.7 °C (98 °F) 10/15/22 0738   Pulse 106 10/15/22 0738   Resp 16 10/15/22 0738   SpO2 100 % 10/14/22 2246           Post Anesthesia Care and Evaluation      Comments: /69 (BP Location: Right arm, Patient Position: Lying)   Pulse 106   Temp 36.7 °C (98 °F) (Oral)   Resp 16   Ht 157.5 cm (62\")   Wt 76.3 kg (168 lb 3.2 oz)   SpO2 100%   Breastfeeding Yes   BMI 30.76 kg/m²     MD was available throughout the duration of the labor epidural.        "

## 2022-10-15 NOTE — PROGRESS NOTES
[facility]  Vaginal Delivery Progress Note    Patient Name: Michelle Norwood  :  1990  MRN:  [id number]      Subjective   Postpartum Day 1:  of a male infant. Stable in NICU     The patient feels well without complaints.  Her pain is well controlled.  Reports normal lochia.     The patient plans to breastfeed.    Objective     Vital Signs Range for the last 24 hours  Temperature: Temp:  [97.3 °F (36.3 °C)-99.4 °F (37.4 °C)] 98 °F (36.7 °C)       BP: BP: ()/(50-83) 105/69   Pulse: Heart Rate:  [] 106   Respirations: Resp:  [16-18] 16                       Physical Exam:  General: Awake and alert  Abdomen: Fundus: firm, non tender, 3 FB  below umbilicus  Extremities:  trace edema, NT     Labs:     Results from last 7 days   Lab Units 10/15/22  0536 10/13/22  0826   WBC 10*3/mm3 17.67* 8.35   HEMOGLOBIN g/dL 9.9* 11.9*   HEMATOCRIT % 29.0* 34.5   PLATELETS 10*3/mm3 248 246       Prenatal labs results reviewed:  Yes   Rubella:  immune  Rh Status:    RH type   Date Value Ref Range Status   10/13/2022 Positive  Final         Assessment & Plan  : 1. PPD1 S/P  - Doing well, continue usual cares. Baby stable in NICU.     2. Postpartum anemia - remains asymptomatic. Iron on discharge.     3. Tachycardia - mild and since admission, EKG sinus tach. No symptoms.            premature rupture of membranes in third trimester    33 weeks gestation of pregnancy    Postpartum anemia          Jorge Infante MD  10/15/2022  12:14 EDT

## 2022-10-16 VITALS
SYSTOLIC BLOOD PRESSURE: 123 MMHG | DIASTOLIC BLOOD PRESSURE: 83 MMHG | OXYGEN SATURATION: 100 % | RESPIRATION RATE: 16 BRPM | HEART RATE: 80 BPM | WEIGHT: 168.2 LBS | TEMPERATURE: 98.3 F | HEIGHT: 62 IN | BODY MASS INDEX: 30.95 KG/M2

## 2022-10-16 RX ORDER — IBUPROFEN 600 MG/1
600 TABLET ORAL EVERY 6 HOURS
Qty: 40 TABLET | Refills: 0 | Status: SHIPPED | OUTPATIENT
Start: 2022-10-16

## 2022-10-16 RX ORDER — ACETAMINOPHEN 500 MG
500 TABLET ORAL EVERY 6 HOURS
Qty: 40 TABLET | Refills: 0 | Status: SHIPPED | OUTPATIENT
Start: 2022-10-16

## 2022-10-16 RX ORDER — AMOXICILLIN 250 MG
1 CAPSULE ORAL 2 TIMES DAILY PRN
Qty: 30 TABLET | Refills: 1 | Status: SHIPPED | OUTPATIENT
Start: 2022-10-16 | End: 2022-11-15

## 2022-10-16 RX ORDER — OXYCODONE HYDROCHLORIDE 5 MG/1
5 TABLET ORAL EVERY 6 HOURS PRN
Qty: 5 TABLET | Refills: 0 | Status: SHIPPED | OUTPATIENT
Start: 2022-10-16

## 2022-10-16 RX ADMIN — IBUPROFEN 600 MG: 600 TABLET, FILM COATED ORAL at 08:33

## 2022-10-16 RX ADMIN — Medication 1 TABLET: at 08:33

## 2022-10-16 RX ADMIN — DOCUSATE SODIUM 100 MG: 100 CAPSULE, LIQUID FILLED ORAL at 08:33

## 2022-10-16 RX ADMIN — ACETAMINOPHEN 500 MG: 500 TABLET ORAL at 08:33

## 2022-10-16 NOTE — DISCHARGE SUMMARY
Vaginal Delivery Discharge Summary      Date of Admission: 10/13/2022    Date of Discharge:  10/16/2022    Patient Name: Michelle Norwood  :  1990  MRN:  [id number]      Surgeon/OB: Vandana GARCIA Traill     Discharge Diagnosis: Vaginal Delivery at 33w5d, uncomplicated recovery    Procedures:  , Spontaneous     10/14/2022    8:28 PM      Anesthesia:  Epidural     Prenatal labs/vax:   Immunization History   Administered Date(s) Administered   • COVID-19 (PFIZER) PURPLE CAP 2021, 2021, 2021         External Prenatal Results     Pregnancy Outside Results - Transcribed From Office Records - See Scanned Records For Details     Test Value Date Time    ABO  B  10/13/22 0826    Rh  Positive  10/13/22 0826    Antibody Screen  Negative  10/13/22 0826    Varicella IgG       Rubella ^ Immune  22     Hgb  9.9 g/dL 10/15/22 0536       11.9 g/dL 10/13/22 0826    Hct  29.0 % 10/15/22 0536       34.5 % 10/13/22 0826    Glucose Fasting GTT       Glucose Tolerance Test 1 hour       Glucose Tolerance Test 3 hour       Gonorrhea (discrete)       Chlamydia (discrete)       RPR ^ Non-Reactive  22     VDRL       Syphilis Antibody       HBsAg ^ Negative  22     Herpes Simplex Virus PCR       Herpes Simplex VIrus Culture       HIV ^ Non-Reactive  22     Hep C RNA Quant PCR       Hep C Antibody       AFP       Group B Strep       GBS Susceptibility to Clindamycin       GBS Susceptibility to Erythromycin       Fetal Fibronectin       Genetic Testing, Maternal Blood             Drug Screening     Test Value Date Time    Urine Drug Screen       Amphetamine Screen       Barbiturate Screen       Benzodiazepine Screen       Methadone Screen       Phencyclidine Screen       Opiates Screen       THC Screen       Cocaine Screen       Propoxyphene Screen       Buprenorphine Screen       Methamphetamine Screen       Oxycodone Screen       Tricyclic Antidepressants Screen             Legend    ^: Historical                         Presenting Problem/History of Present Illness  Premature rupture of membranes [O42.90]     Patient Active Problem List   Diagnosis   • Folliculitis   • Irregular periods   •  premature rupture of membranes in third trimester   • 33 weeks gestation of pregnancy   • Postpartum anemia       Hospital Course  Patient is a 32 y.o. female  at 33w5d status post vaginal delivery.  Patient presented early on 10/13/22 with PPROM. The patient was initially started on latency antibiotics however she began to labor spontaneously early in the morning on 10/14/22. She received betamethasone on 10/13 and 10/14. She was managed expectantly until she entered active labor (6cm dilation). Pitocin was started per protocol. The patient progressed along a normal labor curve and delivered a live, viable male infant. See operative note for details.    Uneventful recovery.  Patient is ambulating, tolerating a regular diet.  Perineum is intact.    Infant:   male  fetus 2170 g (4 lb 12.5 oz)  with Apgar scores of 7 , 9  at five minutes.    Vital Signs  Temp:  [97.9 °F (36.6 °C)-98.3 °F (36.8 °C)] 98.3 °F (36.8 °C)  Heart Rate:  [80-99] 80  Resp:  [16] 16  BP: (108-123)/(72-83) 123/83    Results from last 7 days   Lab Units 10/15/22  0536 10/13/22  0826   WBC 10*3/mm3 17.67* 8.35   HEMOGLOBIN g/dL 9.9* 11.9*   HEMATOCRIT % 29.0* 34.5   PLATELETS 10*3/mm3 248 246             Physical Exam:   General: Awake and alert   Abdomen: Fundus: firm, non tender    Extremities:  Calves NT bilaterally      Discharge Medications     Discharge Medications      New Medications      Instructions Start Date   acetaminophen 500 MG tablet  Commonly known as: TYLENOL   500 mg, Oral, Every 6 Hours, Alternate every 3 hours with ibuprofen      ibuprofen 600 MG tablet  Commonly known as: ADVIL,MOTRIN   600 mg, Oral, Every 6 Hours, Alternate every 3 hours with acetaminophen      oxyCODONE 5 MG immediate release tablet  Commonly known as:  Roxicodone   5 mg, Oral, Every 6 Hours PRN      sennosides-docusate 8.6-50 MG per tablet  Commonly known as: PERICOLACE   1 tablet, Oral, 2 Times Daily PRN         Continue These Medications      Instructions Start Date   prenatal (CLASSIC) vitamin  tablet  Generic drug: prenatal vitamin   1 tablet, Oral, Daily         Stop These Medications    fluticasone 50 MCG/ACT nasal spray  Commonly known as: FLONASE            Discharge Diet: Regular    Activity at Discharge: restrictions reviewed (I.e. pelvic rest until postpartum visit). Call physician if pain becomes severe, bleeding saturating greater than 1 pad per hour, temp greater than 100.4, or unable to tolerate oral diet      Condition on Discharge:  Stable    Discharge Disposition  Home or Self Care    Discharge Instructions:  Activity at Discharge: restrictions reviewed (I.e. pelvic rest until postpartum visit). May shower as usual, encouraged Sitz baths for perineal comfort.     Patient is to call the office for:   Vaginal bleeding greater than 1 pad per hour  Temperature greater than 101  Excessive vomiting or diarrhea  Mental or Mood changes  Shortness of breath or chest pain  Pain that is worsening or current pain medication is not adequate  Weight increase by 2 lbs per day or 5 lbs per week  Redness, swelling or drainage, or pulling apart of incision          Vandana Cameron MD  10/16/22  13:45 EDT

## 2022-10-16 NOTE — LACTATION NOTE
Patient is being discharged but would like to board . She knows the HGP can go with her to boarding room and back and forth to NICU. Faxed script for PBP. She a Spectra at home but would like something different for this baby.

## 2023-04-18 ENCOUNTER — OFFICE VISIT (OUTPATIENT)
Dept: INTERNAL MEDICINE | Facility: CLINIC | Age: 33
End: 2023-04-18
Payer: COMMERCIAL

## 2023-04-18 VITALS
TEMPERATURE: 97.6 F | DIASTOLIC BLOOD PRESSURE: 78 MMHG | SYSTOLIC BLOOD PRESSURE: 120 MMHG | WEIGHT: 150 LBS | RESPIRATION RATE: 16 BRPM | HEIGHT: 62 IN | HEART RATE: 102 BPM | BODY MASS INDEX: 27.6 KG/M2 | OXYGEN SATURATION: 97 %

## 2023-04-18 DIAGNOSIS — R21 RASH: Primary | ICD-10-CM

## 2023-04-18 RX ORDER — ACETAMINOPHEN AND CODEINE PHOSPHATE 120; 12 MG/5ML; MG/5ML
1 SOLUTION ORAL DAILY
COMMUNITY
Start: 2023-02-24

## 2023-04-18 NOTE — PROGRESS NOTES
"Chief Complaint  Rash    Subjective          Michelle Norwood presents to [facility] for rash.   She endorses a persistent rash around her nose, mouth, and behind her ears. She states it has been there for as long as she can remember.   Was applying some type of \"Chinese elbow cream\" which seemed to help but eventually stopped helping.   She feels like she has tried so many OTC products and nothing has helped. No new lotions or moisturizers.     Objective   Vital Signs:   /78   Pulse 102   Temp 97.6 °F (36.4 °C)   Resp 16   Ht 157.5 cm (62\")   Wt 68 kg (150 lb)   SpO2 97%   BMI 27.44 kg/m²     Physical Exam  Vitals and nursing note reviewed.   Constitutional:       General: She is not in acute distress.     Appearance: Normal appearance.   Eyes:      General: No scleral icterus.     Conjunctiva/sclera: Conjunctivae normal.   Cardiovascular:      Rate and Rhythm: Normal rate and regular rhythm.      Heart sounds: Normal heart sounds. No murmur heard.  Pulmonary:      Effort: Pulmonary effort is normal. No respiratory distress.      Breath sounds: Normal breath sounds.   Abdominal:      General: Bowel sounds are normal. There is no distension.      Palpations: Abdomen is soft.   Musculoskeletal:         General: No swelling.      Right lower leg: No edema.      Left lower leg: No edema.   Skin:     Comments: Scaly plaque lesions on posterior right ear, under nostrils, and in perioral region; no signs of secondary infection    Neurological:      General: No focal deficit present.      Mental Status: She is alert. Mental status is at baseline.        Result Review : : None       Assessment and Plan      Diagnoses and all orders for this visit:    1. Rash (Primary)  - Rash most consistent with psoriasis given its scaly, plaque-like appearance  - Will try Hydrocortisone 2.5% cream, sent to pharmacy as below   - If no improvement with above, will transition to " triamcinolone trial  - Return to clinic or contact if no improvement or worsening     Orders:  -     hydrocortisone 2.5 % ointment; Apply 1 application topically to the appropriate area as directed 2 (Two) Times a Day.  Dispense: 28.35 g; Refill: 2      Follow Up   Return if symptoms worsen or fail to improve.    Patient was given instructions and counseling regarding her condition or for health maintenance advice. Please see specific information pulled into the AVS if appropriate.     Yesika Sutherland MD  Internal Medicine and Pediatrics, PGY-4    Patient seen and evaluated with Dr. Sutherland. Pertinent portions of history and exam repeated. Agree with assessment and plan as above. 31 yo F here for evaluation of rash. Has been present intermittently for a while. Exam most consistent with facial psoriasis. Will attempt low potency steroid to see if this will be effective given sensitive areas.     Windy Hull MD  [facility] [place] [facility]  Phone: [phone]  Fax: [phone]

## 2023-05-01 ENCOUNTER — OFFICE VISIT (OUTPATIENT)
Dept: INTERNAL MEDICINE | Facility: CLINIC | Age: 33
End: 2023-05-01
Payer: COMMERCIAL

## 2023-05-01 VITALS
WEIGHT: 153 LBS | HEART RATE: 106 BPM | OXYGEN SATURATION: 98 % | RESPIRATION RATE: 16 BRPM | TEMPERATURE: 96.5 F | HEIGHT: 62 IN | BODY MASS INDEX: 28.16 KG/M2

## 2023-05-01 DIAGNOSIS — R30.9 PAINFUL URINATION: Primary | ICD-10-CM

## 2023-05-01 DIAGNOSIS — R21 RASH: ICD-10-CM

## 2023-05-01 LAB
BILIRUB BLD-MCNC: NEGATIVE MG/DL
CLARITY, POC: CLEAR
COLOR UR: YELLOW
EXPIRATION DATE: ABNORMAL
GLUCOSE UR STRIP-MCNC: NEGATIVE MG/DL
KETONES UR QL: NEGATIVE
LEUKOCYTE EST, POC: ABNORMAL
Lab: ABNORMAL
NITRITE UR-MCNC: NEGATIVE MG/ML
PH UR: 6 [PH] (ref 5–8)
PROT UR STRIP-MCNC: NEGATIVE MG/DL
RBC # UR STRIP: NEGATIVE /UL
SP GR UR: 1.01 (ref 1–1.03)
UROBILINOGEN UR QL: NORMAL

## 2023-05-01 RX ORDER — TRIAMCINOLONE ACETONIDE 0.25 MG/G
1 OINTMENT TOPICAL 2 TIMES DAILY
Qty: 80 G | Refills: 1 | Status: SHIPPED | OUTPATIENT
Start: 2023-05-01

## 2023-05-01 RX ORDER — NITROFURANTOIN 25; 75 MG/1; MG/1
100 CAPSULE ORAL 2 TIMES DAILY
Qty: 10 CAPSULE | Refills: 0 | Status: SHIPPED | OUTPATIENT
Start: 2023-05-01

## 2023-05-01 NOTE — PROGRESS NOTES
"Chief Complaint  Urinary Tract Infection    Subjective          Michelle Norwood presents to [facility] for possible UTI. Started with dysuria 3 days ago. + frequency, urgency, hesitancy, and incomplete voiding. No hematuria, fever.   Also has tried low potency steroid cream on face without much improvement.     Objective   Vital Signs:     Pulse 106   Temp 96.5 °F (35.8 °C)   Resp 16   Ht 157.5 cm (62\")   Wt 69.4 kg (153 lb)   SpO2 98%   BMI 27.98 kg/m²     Physical Exam  Vitals and nursing note reviewed.   Constitutional:       General: She is not in acute distress.     Appearance: Normal appearance.   Cardiovascular:      Rate and Rhythm: Normal rate and regular rhythm.      Pulses: Normal pulses.      Heart sounds: Normal heart sounds. No murmur heard.  Pulmonary:      Effort: Pulmonary effort is normal. No respiratory distress.      Breath sounds: Normal breath sounds.   Abdominal:      General: Abdomen is flat. Bowel sounds are normal.      Palpations: Abdomen is soft.      Tenderness: There is no abdominal tenderness. There is no right CVA tenderness or left CVA tenderness.   Musculoskeletal:      Right lower leg: No edema.      Left lower leg: No edema.   Neurological:      Mental Status: She is alert and oriented to person, place, and time. Mental status is at baseline.   Psychiatric:         Mood and Affect: Mood normal.         Behavior: Behavior normal.        Brief Urine Lab Results  (Last result in the past 365 days)      Color   Clarity   Blood   Leuk Est   Nitrite   Protein   CREAT   Urine HCG        05/01/23 1605 Yellow   Clear   Negative   Moderate (2+)   Negative   Negative                 Result Review : : None     Assessment and Plan      Diagnoses and all orders for this visit:    1. Painful urination (Primary)   - UA with LE today and symptoms consistent with UTI   - start empiric abx as below   - urine sent for culture   - increase water " intake     Orders:  -     POCT urinalysis dipstick, automated  -     Urine Culture - Urine, Urine, Clean Catch  -     nitrofurantoin, macrocrystal-monohydrate, (Macrobid) 100 MG capsule; Take 1 capsule by mouth 2 (Two) Times a Day.  Dispense: 10 capsule; Refill: 0    2. Rash  -     triamcinolone (KENALOG) 0.025 % ointment; Apply 1 application topically to the appropriate area as directed 2 (Two) Times a Day.  Dispense: 80 g; Refill: 1        Follow Up   Return if symptoms worsen or fail to improve.    Patient was given instructions and counseling regarding her condition or for health maintenance advice. Please see specific information pulled into the AVS if appropriate.     Windy Hull MD  [facility] [place] [facility] [place] [facility]  Phone: [phone]  Fax: [phone]

## 2023-05-04 LAB
BACTERIA UR CULT: ABNORMAL
BACTERIA UR CULT: ABNORMAL
OTHER ANTIBIOTIC SUSC ISLT: ABNORMAL

## 2024-04-25 ENCOUNTER — OFFICE VISIT (OUTPATIENT)
Dept: FAMILY MEDICINE CLINIC | Facility: CLINIC | Age: 34
End: 2024-04-25
Payer: COMMERCIAL

## 2024-04-25 VITALS
DIASTOLIC BLOOD PRESSURE: 86 MMHG | OXYGEN SATURATION: 98 % | WEIGHT: 151 LBS | BODY MASS INDEX: 27.79 KG/M2 | HEART RATE: 69 BPM | HEIGHT: 62 IN | SYSTOLIC BLOOD PRESSURE: 121 MMHG

## 2024-04-25 DIAGNOSIS — Z78.9 BREASTFED INFANT: ICD-10-CM

## 2024-04-25 DIAGNOSIS — Z13.220 SCREENING FOR CHOLESTEROL LEVEL: ICD-10-CM

## 2024-04-25 DIAGNOSIS — Z11.59 NEED FOR HEPATITIS C SCREENING TEST: ICD-10-CM

## 2024-04-25 DIAGNOSIS — Z00.00 ANNUAL PHYSICAL EXAM: ICD-10-CM

## 2024-04-25 DIAGNOSIS — Z13.1 SCREENING FOR DIABETES MELLITUS: ICD-10-CM

## 2024-04-25 DIAGNOSIS — L40.9 PSORIASIS: Primary | ICD-10-CM

## 2024-04-25 PROBLEM — Z3A.33 33 WEEKS GESTATION OF PREGNANCY: Status: RESOLVED | Noted: 2022-10-14 | Resolved: 2024-04-25

## 2024-04-25 PROBLEM — N92.6 IRREGULAR PERIODS: Status: RESOLVED | Noted: 2021-08-09 | Resolved: 2024-04-25

## 2024-04-25 PROCEDURE — 99395 PREV VISIT EST AGE 18-39: CPT | Performed by: FAMILY MEDICINE

## 2024-04-25 PROCEDURE — 99213 OFFICE O/P EST LOW 20 MIN: CPT | Performed by: FAMILY MEDICINE

## 2024-04-25 NOTE — PROGRESS NOTES
"Chief Complaint  Chief Complaint   Patient presents with    Establish Care    wean breastfeeding    Psoriasis       Subjective    History of Present Illness  Michelle Norwood is a 33 y.o. female presents to [facility] to establish care. Moved here in 2019 from [facility].   Occupation: nail tech  Hobbies: spend time with her kids, age 6 and 18mo  Diet: \"Bad\"- she is very busy with kids and work, eats delicious [place]ian food- but high in salt and lot of rice  Physical activity: Norberto her kids  Support system: , her inlaws live in town but aren't super involved, has friends and coworkers  Sleep: \"Not good\"- still breastfeeding, she would like to wean.   Mood: Good. Too busy to feel anxious or depressed.   Health goals: Help weaning.  She would like another baby, try to get pregnant next year. So she wants to stay healthy.  She hasn't had a checkup since she was in [facility].   She has a rash under her nose and behind her ear for a long time, 5-6 years, trying topical steroid prescribed by previous primary care and it's not working.     Current Outpatient Medications on File Prior to Visit   Medication Sig Dispense Refill    clobetasol prop emollient base (TEMOVATE) 0.05 % emollient cream Apply 1 application topically to the appropriate area as directed 2 (Two) Times a Day. 60 g 1    triamcinolone (KENALOG) 0.025 % ointment Apply 1 application topically to the appropriate area as directed 2 (Two) Times a Day. 80 g 1    [DISCONTINUED] acetaminophen (TYLENOL) 500 MG tablet Take 1 tablet by mouth Every 6 (Six) Hours. Alternate every 3 hours with ibuprofen (Patient not taking: Reported on 4/25/2024) 40 tablet 0    [DISCONTINUED] hydrocortisone 2.5 % ointment Apply 1 application topically to the appropriate area as directed 2 (Two) Times a Day. (Patient not taking: Reported on 4/25/2024) 28.35 g 2    [DISCONTINUED] ibuprofen (ADVIL,MOTRIN) 600 MG tablet Take 1 tablet by mouth Every 6 (Six) " Hours. Alternate every 3 hours with acetaminophen (Patient not taking: Reported on 2024) 40 tablet 0    [DISCONTINUED] nitrofurantoin, macrocrystal-monohydrate, (Macrobid) 100 MG capsule Take 1 capsule by mouth 2 (Two) Times a Day. (Patient not taking: Reported on 2024) 10 capsule 0    [DISCONTINUED] norethindrone (MICRONOR) 0.35 MG tablet Take 1 tablet by mouth Daily. (Patient not taking: Reported on 2024)      [DISCONTINUED] oxyCODONE (Roxicodone) 5 MG immediate release tablet Take 1 tablet by mouth Every 6 (Six) Hours As Needed for Severe Pain. (Patient not taking: Reported on 2024) 5 tablet 0    [DISCONTINUED] prenatal vitamin (prenatal, CLASSIC, vitamin) tablet Take 1 tablet by mouth Daily. (Patient not taking: Reported on 2024)       No current facility-administered medications on file prior to visit.       Patient Active Problem List   Diagnosis    Folliculitis    Psoriasis       Past Medical History:   Diagnosis Date    33 weeks gestation of pregnancy 10/14/2022    Folliculitis 2021    Irregular periods 2021    Irregular periods 2021    Postpartum anemia 10/15/2022     premature rupture of membranes in third trimester 10/13/2022       Past Surgical History:   Procedure Laterality Date     SECTION N/A 10/25/2018    WISDOM TOOTH EXTRACTION         Family History   Problem Relation Age of Onset    Hypertension Mother     Stroke Maternal Uncle        Health Maintenance Summary            Ordered - HEPATITIS C SCREENING (Once) Ordered on 2024      No completion, postpone, or frequency change history exists for this topic.              Postponed - BMI FOLLOWUP (Yearly) Postponed until 2024  Postponed until 2024 by Aisha Ace MD (Pending event)              Postponed - PAP SMEAR (Every 3 Years) Postponed until 2024  Postponed until 2024 by Aisha Ace MD (Pending event)               "Postponed - COVID-19 Vaccine (4 - 2023-24 season) Postponed until 7/15/2024      04/25/2024  Postponed until 7/15/2024 by Aisha Ace MD (Product Unavailable)    12/28/2021  Imm Admin: COVID-19 (PFIZER) Purple Cap Monovalent    04/03/2021  Imm Admin: COVID-19 (PFIZER) PURPLE CAP    03/13/2021  Imm Admin: COVID-19 (PFIZER) PURPLE CAP              INFLUENZA VACCINE (Yearly - August to March) Next due on 8/1/2024 09/14/2022  Outside Immunization: Flu MDCK Quad P-Free Inj              ANNUAL PHYSICAL (Yearly) Next due on 4/25/2025 04/25/2024  Done              TDAP/TD VACCINES (2 - Td or Tdap) Next due on 9/14/2032 09/14/2022  Outside Immunization: Tdap, Adsorbed              Pneumococcal Vaccine 0-64 (Series Information) Aged Out      No completion, postpone, or frequency change history exists for this topic.                       Objective   Vitals:    04/25/24 0854   BP: 121/86   Pulse: 69   SpO2: 98%   Weight: 68.5 kg (151 lb)   Height: 157.5 cm (62\")        Physical Exam  Vitals reviewed.   Constitutional:       General: She is not in acute distress.     Appearance: Normal appearance.   HENT:      Head: Normocephalic and atraumatic.      Right Ear: Tympanic membrane, ear canal and external ear normal.      Left Ear: Tympanic membrane, ear canal and external ear normal.      Nose: Nose normal. No rhinorrhea.      Mouth/Throat:      Mouth: Mucous membranes are moist.      Pharynx: No posterior oropharyngeal erythema.   Eyes:      Extraocular Movements: Extraocular movements intact.      Conjunctiva/sclera: Conjunctivae normal.      Pupils: Pupils are equal, round, and reactive to light.   Neck:      Comments: No thyromegaly or thyroid nodule on palpation  Cardiovascular:      Rate and Rhythm: Normal rate and regular rhythm.      Pulses: Normal pulses.      Heart sounds: Normal heart sounds. No murmur heard.  Pulmonary:      Effort: Pulmonary effort is normal.      Breath sounds: Normal breath " sounds. No wheezing.   Abdominal:      General: Bowel sounds are normal. There is no distension.      Palpations: Abdomen is soft.      Tenderness: There is no abdominal tenderness.   Musculoskeletal:         General: No tenderness or deformity. Normal range of motion.      Cervical back: Normal range of motion and neck supple.   Lymphadenopathy:      Cervical: No cervical adenopathy.   Skin:     General: Skin is warm and dry.      Capillary Refill: Capillary refill takes less than 2 seconds.      Findings: Rash (Scaly erythematous rash under nares and above upper lip; raised patch behind right ear with scale atop erythematous base) present. No lesion.   Neurological:      General: No focal deficit present.      Mental Status: She is alert and oriented to person, place, and time.      Gait: Gait normal.      Deep Tendon Reflexes: Reflexes normal.   Psychiatric:         Mood and Affect: Mood normal.         Behavior: Behavior normal.         Judgment: Judgment normal.          PHQ-9 Depression Screening  Little interest or pleasure in doing things? 0-->not at all   Feeling down, depressed, or hopeless? 0-->not at all   Trouble falling or staying asleep, or sleeping too much?     Feeling tired or having little energy?     Poor appetite or overeating?     Feeling bad about yourself - or that you are a failure or have let yourself or your family down?     Trouble concentrating on things, such as reading the newspaper or watching television?     Moving or speaking so slowly that other people could have noticed? Or the opposite - being so fidgety or restless that you have been moving around a lot more than usual?     Thoughts that you would be better off dead, or of hurting yourself in some way?     PHQ-9 Total Score 0   If you checked off any problems, how difficult have these problems made it for you to do your work, take care of things at home, or get along with other people?       The following data was reviewed by:  Aisha Ace MD on 04/25/2024:  \ 2023 PCP note      Assessment and Plan  Michelle Norwood is a 33 y.o. female presents to [facility] today to establish care and discuss health goals/concerns, chart reviewed and updated, medications reviewed, labs reviewed, preventative med reviewed. Patient has not been seen by primary care for annual exam in the last 12 months.. Answered all questions at this time, pt expressed no further concerns today.     Preventative medicine:   Eye exam: Not up to date.  Encouraged annual eye exam.  Dental exam: Up to date.    BP: normal  Lipid Panel :   Ordered    A1c:  ordered    Hep C screening: ordered  Colon cancer screening UTD- age 45-75: N/A  Lung cancer screening UTD- age 50-75: N/A  Immunizations UTD: Yes  Anxiety/depression screening: normal  Tobacco cessation counseling: N/A    Women:   Pap age 21-65: Not up to date.  Patient to schedule with PCP or gyn  Mammogram age 40-75:  N/A    Osteoporosis Screen age 65:  N/A      Diagnoses and all orders for this visit:    1. Psoriasis (Primary)  Overview:  Dx 2023 by primary, tx with topical hydrocortisone and topical clobetasol. No improvement with topical tx.     Assessment & Plan:  Will ref to derm to confirm dx and for next level care rowena given patient planning pregnancy next year.     Orders:  -     Ambulatory Referral to Dermatology    2.  infant  Comments:  Information in AVS on weaning; encouraged slow reduction to minimize risk of mastitis    3. Need for hepatitis C screening test  -     Hepatitis C Antibody; Future    4. Screening for diabetes mellitus  -     Comprehensive Metabolic Panel  -     Hemoglobin A1c    5. Screening for cholesterol level  -     Comprehensive Metabolic Panel  -     Lipid Panel    6. Annual physical exam  -     CBC & Differential        Patient voiced understanding and agreement with plan of care and had no further questions or concerns at this time.     Problems  addressed:  established problem: not responding to treatment  Complexity: labs ordered yes, records reviewied and summarized    Aisha Ace MD  Family Medicine  [facility]    Follow Up  Return for Due well woman when gets back from trip this summer.    Patient Instructions   Today: Update screening labs.     Weaning techniques: https://infibond/ages/weaning/wean-how/weaning-techniques/  Can consider cold cabbage leaf bra, peppermint tea to reduce milk, or sudafed    Meds: No changes at this time    Referrals: Dermatology, you should receive a call within 1 week to schedule the appointment.  If you do not hear anything please let us know.    Healthy lifestyle tips  - Reduce intake of processed food (shop perimeter of grocery store), especially white flour and sugar  - Increase intake of fruits/veggies  - Drink 32-64oz of water a day  - Quit smoking if you smoke  - Drink no more than 2 alcoholic beverages/day if you are male, no more than 1 alcoholic beverage/day if you are female  - Get 6-8 hours of restful sleep at night- poor sleep quality has been linked to increased risk of cardiovascular disease  - Voluntary physical activity cumulative 120-150 minutes/week  - Stress management utilizing meditation and mindfulness (InsightTimer, free jack)  - Keep blood pressure < 140/90    Heart healthy diet from AHA: https://www.heart.org/en/healthy-living/healthy-eating/eat-smart/nutrition-basics/aha-diet-and-lifestyle-recommendations

## 2024-04-25 NOTE — PATIENT INSTRUCTIONS
Today: Update screening labs.     Weaning techniques: https://RoboCent/ages/weaning/wean-how/weaning-techniques/  Can consider cold cabbage leaf bra, peppermint tea to reduce milk, or sudafed    Meds: No changes at this time    Referrals: Dermatology, you should receive a call within 1 week to schedule the appointment.  If you do not hear anything please let us know.    Healthy lifestyle tips  - Reduce intake of processed food (shop perimeter of grocery store), especially white flour and sugar  - Increase intake of fruits/veggies  - Drink 32-64oz of water a day  - Quit smoking if you smoke  - Drink no more than 2 alcoholic beverages/day if you are male, no more than 1 alcoholic beverage/day if you are female  - Get 6-8 hours of restful sleep at night- poor sleep quality has been linked to increased risk of cardiovascular disease  - Voluntary physical activity cumulative 120-150 minutes/week  - Stress management utilizing meditation and mindfulness (KickboardTimer, free jack)  - Keep blood pressure < 140/90    Heart healthy diet from AHA: https://www.heart.org/en/healthy-living/healthy-eating/eat-smart/nutrition-basics/aha-diet-and-lifestyle-recommendations

## 2024-04-25 NOTE — ASSESSMENT & PLAN NOTE
Will ref to derm to confirm dx and for next level care rowena given patient planning pregnancy next year.

## 2024-04-26 ENCOUNTER — PATIENT ROUNDING (BHMG ONLY) (OUTPATIENT)
Dept: FAMILY MEDICINE CLINIC | Facility: CLINIC | Age: 34
End: 2024-04-26
Payer: COMMERCIAL

## 2024-04-26 LAB
ALBUMIN SERPL-MCNC: 4.4 G/DL (ref 3.5–5.2)
ALBUMIN/GLOB SERPL: 1.6 G/DL
ALP SERPL-CCNC: 63 U/L (ref 39–117)
ALT SERPL-CCNC: 16 U/L (ref 1–33)
AST SERPL-CCNC: 14 U/L (ref 1–32)
BASOPHILS # BLD AUTO: 0.04 10*3/MM3 (ref 0–0.2)
BASOPHILS NFR BLD AUTO: 0.7 % (ref 0–1.5)
BILIRUB SERPL-MCNC: 0.6 MG/DL (ref 0–1.2)
BUN SERPL-MCNC: 10 MG/DL (ref 6–20)
BUN/CREAT SERPL: 11.4 (ref 7–25)
CALCIUM SERPL-MCNC: 9.1 MG/DL (ref 8.6–10.5)
CHLORIDE SERPL-SCNC: 106 MMOL/L (ref 98–107)
CHOLEST SERPL-MCNC: 177 MG/DL (ref 0–200)
CO2 SERPL-SCNC: 27.9 MMOL/L (ref 22–29)
CREAT SERPL-MCNC: 0.88 MG/DL (ref 0.57–1)
EGFRCR SERPLBLD CKD-EPI 2021: 90.2 ML/MIN/1.73
EOSINOPHIL # BLD AUTO: 0.13 10*3/MM3 (ref 0–0.4)
EOSINOPHIL NFR BLD AUTO: 2.1 % (ref 0.3–6.2)
ERYTHROCYTE [DISTWIDTH] IN BLOOD BY AUTOMATED COUNT: 12.6 % (ref 12.3–15.4)
GLOBULIN SER CALC-MCNC: 2.8 GM/DL
GLUCOSE SERPL-MCNC: 86 MG/DL (ref 65–99)
HBA1C MFR BLD: 5.3 % (ref 4.8–5.6)
HCT VFR BLD AUTO: 43.9 % (ref 34–46.6)
HDLC SERPL-MCNC: 46 MG/DL (ref 40–60)
HGB BLD-MCNC: 14.5 G/DL (ref 12–15.9)
IMM GRANULOCYTES # BLD AUTO: 0.01 10*3/MM3 (ref 0–0.05)
IMM GRANULOCYTES NFR BLD AUTO: 0.2 % (ref 0–0.5)
LDLC SERPL CALC-MCNC: 114 MG/DL (ref 0–100)
LYMPHOCYTES # BLD AUTO: 2.11 10*3/MM3 (ref 0.7–3.1)
LYMPHOCYTES NFR BLD AUTO: 34.8 % (ref 19.6–45.3)
MCH RBC QN AUTO: 29.7 PG (ref 26.6–33)
MCHC RBC AUTO-ENTMCNC: 33 G/DL (ref 31.5–35.7)
MCV RBC AUTO: 90 FL (ref 79–97)
MONOCYTES # BLD AUTO: 0.5 10*3/MM3 (ref 0.1–0.9)
MONOCYTES NFR BLD AUTO: 8.3 % (ref 5–12)
NEUTROPHILS # BLD AUTO: 3.27 10*3/MM3 (ref 1.7–7)
NEUTROPHILS NFR BLD AUTO: 53.9 % (ref 42.7–76)
NRBC BLD AUTO-RTO: 0 /100 WBC (ref 0–0.2)
PLATELET # BLD AUTO: 263 10*3/MM3 (ref 140–450)
POTASSIUM SERPL-SCNC: 4 MMOL/L (ref 3.5–5.2)
PROT SERPL-MCNC: 7.2 G/DL (ref 6–8.5)
RBC # BLD AUTO: 4.88 10*6/MM3 (ref 3.77–5.28)
SODIUM SERPL-SCNC: 142 MMOL/L (ref 136–145)
TRIGL SERPL-MCNC: 92 MG/DL (ref 0–150)
VLDLC SERPL CALC-MCNC: 17 MG/DL (ref 5–40)
WBC # BLD AUTO: 6.06 10*3/MM3 (ref 3.4–10.8)

## 2024-04-26 NOTE — PROGRESS NOTES
Reyes Ms. Norwood,    My name is Alicia, I am a medical assistant here at Dr. Ace's office. We hope your recent visit with us went well.     If you have any questions or concerns, please don't hesitate to reach out. Thank you and have a great day!    LORI Vargas

## 2024-04-26 NOTE — PROGRESS NOTES
Hello!    Here are the results of your most recent labs:    Your CBC, Comprehensive Metabolic Panel, and Hgb A1C was all normal.     Your cholesterol was elevated.  For diet and lifestyle intervention, I recommend decreasing intake of trans and saturated fats, and red meat.  Increase physical activity as tolerated.  You may try supplementing with omega-3 1-2g daily, berberine 500mg daily, and/or whole flaxseed if desired.    Thank you!  Dr. Ace

## 2024-05-01 LAB
ALBUMIN SERPL-MCNC: 4.4 G/DL (ref 3.5–5.2)
ALBUMIN/GLOB SERPL: 1.6 G/DL
ALP SERPL-CCNC: 63 U/L (ref 39–117)
ALT SERPL-CCNC: 16 U/L (ref 1–33)
AST SERPL-CCNC: 14 U/L (ref 1–32)
BASOPHILS # BLD AUTO: 0.04 10*3/MM3 (ref 0–0.2)
BASOPHILS NFR BLD AUTO: 0.7 % (ref 0–1.5)
BILIRUB SERPL-MCNC: 0.6 MG/DL (ref 0–1.2)
BUN SERPL-MCNC: 10 MG/DL (ref 6–20)
BUN/CREAT SERPL: 14.9 (ref 7–25)
CALCIUM SERPL-MCNC: 9.1 MG/DL (ref 8.6–10.5)
CHLORIDE SERPL-SCNC: 106 MMOL/L (ref 98–107)
CHOLEST SERPL-MCNC: 177 MG/DL (ref 0–200)
CO2 SERPL-SCNC: 27.9 MMOL/L (ref 22–29)
CREAT SERPL-MCNC: 0.67 MG/DL (ref 0.57–1)
EGFRCR SERPLBLD CKD-EPI 2021: 120 ML/MIN/1.73
EOSINOPHIL # BLD AUTO: 0.13 10*3/MM3 (ref 0–0.4)
EOSINOPHIL NFR BLD AUTO: 2.1 % (ref 0.3–6.2)
ERYTHROCYTE [DISTWIDTH] IN BLOOD BY AUTOMATED COUNT: 12.6 % (ref 12.3–15.4)
GLOBULIN SER CALC-MCNC: 2.8 GM/DL
GLUCOSE SERPL-MCNC: 86 MG/DL (ref 65–99)
HBA1C MFR BLD: 5.3 % (ref 4.8–5.6)
HCT VFR BLD AUTO: 43.9 % (ref 34–46.6)
HDLC SERPL-MCNC: 46 MG/DL (ref 40–60)
HGB BLD-MCNC: 14.5 G/DL (ref 12–15.9)
IMM GRANULOCYTES # BLD AUTO: 0.01 10*3/MM3 (ref 0–0.05)
IMM GRANULOCYTES NFR BLD AUTO: 0.2 % (ref 0–0.5)
LDLC SERPL CALC-MCNC: 114 MG/DL (ref 0–100)
LYMPHOCYTES # BLD AUTO: 2.11 10*3/MM3 (ref 0.7–3.1)
LYMPHOCYTES NFR BLD AUTO: 34.8 % (ref 19.6–45.3)
MCH RBC QN AUTO: 29.7 PG (ref 26.6–33)
MCHC RBC AUTO-ENTMCNC: 33 G/DL (ref 31.5–35.7)
MCV RBC AUTO: 90 FL (ref 79–97)
MONOCYTES # BLD AUTO: 0.5 10*3/MM3 (ref 0.1–0.9)
MONOCYTES NFR BLD AUTO: 8.3 % (ref 5–12)
NEUTROPHILS # BLD AUTO: 3.27 10*3/MM3 (ref 1.7–7)
NEUTROPHILS NFR BLD AUTO: 53.9 % (ref 42.7–76)
NRBC BLD AUTO-RTO: 0 /100 WBC (ref 0–0.2)
PLATELET # BLD AUTO: 263 10*3/MM3 (ref 140–450)
POTASSIUM SERPL-SCNC: 4 MMOL/L (ref 3.5–5.2)
PROT SERPL-MCNC: 7.2 G/DL (ref 6–8.5)
RBC # BLD AUTO: 4.88 10*6/MM3 (ref 3.77–5.28)
SODIUM SERPL-SCNC: 142 MMOL/L (ref 136–145)
TRIGL SERPL-MCNC: 92 MG/DL (ref 0–150)
VLDLC SERPL CALC-MCNC: 17 MG/DL (ref 5–40)
WBC # BLD AUTO: 6.06 10*3/MM3 (ref 3.4–10.8)

## 2024-05-05 ENCOUNTER — PATIENT MESSAGE (OUTPATIENT)
Dept: FAMILY MEDICINE CLINIC | Facility: CLINIC | Age: 34
End: 2024-05-05
Payer: COMMERCIAL

## 2024-08-15 ENCOUNTER — OFFICE VISIT (OUTPATIENT)
Dept: FAMILY MEDICINE CLINIC | Facility: CLINIC | Age: 34
End: 2024-08-15
Payer: COMMERCIAL

## 2024-08-15 ENCOUNTER — PATIENT MESSAGE (OUTPATIENT)
Dept: FAMILY MEDICINE CLINIC | Facility: CLINIC | Age: 34
End: 2024-08-15

## 2024-08-15 VITALS
OXYGEN SATURATION: 97 % | DIASTOLIC BLOOD PRESSURE: 76 MMHG | SYSTOLIC BLOOD PRESSURE: 107 MMHG | HEART RATE: 82 BPM | HEIGHT: 62 IN | WEIGHT: 153 LBS | BODY MASS INDEX: 28.16 KG/M2

## 2024-08-15 DIAGNOSIS — L40.9 PSORIASIS: ICD-10-CM

## 2024-08-15 DIAGNOSIS — N89.8 VAGINAL DISCHARGE: ICD-10-CM

## 2024-08-15 DIAGNOSIS — Z01.419 ENCOUNTER FOR ROUTINE GYNECOLOGICAL EXAMINATION WITH PAPANICOLAOU SMEAR OF CERVIX: Primary | ICD-10-CM

## 2024-08-15 RX ORDER — COPPER 313.4 MG/1
1 INTRAUTERINE DEVICE INTRAUTERINE ONCE
COMMUNITY

## 2024-08-15 RX ORDER — TACROLIMUS 1 MG/G
OINTMENT TOPICAL
COMMUNITY
Start: 2024-07-24

## 2024-08-15 NOTE — PROGRESS NOTES
Chief Complaint  Chief Complaint   Patient presents with    Gynecologic Exam       Subjective    History of Present Illness  Michelle Norwood is a 34 y.o. female presents to [facility] for well woman exam.  There are no acute concerns today. She saw dermatology for her psoriasis, they prescribed a different ointment which helped a lot.  She recently returned from a trip back to visit family in [facility], she brought her 2 children with her and they experienced a lot of rashes while they are which was frustrating but overall she had a very good time.  Still nursing her toddler at night, working on weaning during the daytime.  The patient is sexually active with . There is no pain or bleeding with intercourse.  The current method of family planning is copper IUD- previously had nexplanon, but it made her cycle irregular.   Patient has been pregnant 3 times. Patient has given birth 2 times. First was emergency , second baby vaginal delivery.   There is no incontinence with laughing/sneezing/running/jumping.  There is no pelvic pain- some discomfort related to her period.  There is vaginal discharge- since Iud was inserted, there is more vaginal discharge than normal. It is not itchy, occasional smell   There is no dysuria.  There is no abnormal breast lumps or discharge. Patient is doing self-exams.          No LMP recorded.    Current Outpatient Medications on File Prior to Visit   Medication Sig Dispense Refill    tacrolimus (PROTOPIC) 0.1 % ointment       Paragard Intrauterine Copper intrauterine device IUD To be inserted one time by prescriber. Route intrauterine.  Inserted       [DISCONTINUED] clobetasol prop emollient base (TEMOVATE) 0.05 % emollient cream Apply 1 application topically to the appropriate area as directed 2 (Two) Times a Day. (Patient not taking: Reported on 8/15/2024) 60 g 1    [DISCONTINUED] triamcinolone (KENALOG) 0.025 % ointment Apply 1 application  topically to the appropriate area as directed 2 (Two) Times a Day. (Patient not taking: Reported on 8/15/2024) 80 g 1     No current facility-administered medications on file prior to visit.       Patient Active Problem List   Diagnosis    Folliculitis    Psoriasis       Past Medical History:   Diagnosis Date    33 weeks gestation of pregnancy 10/14/2022    Folliculitis 2021    Irregular periods 2021    Irregular periods 2021    Postpartum anemia 10/15/2022     premature rupture of membranes in third trimester 10/13/2022       Past Surgical History:   Procedure Laterality Date     SECTION N/A 10/25/2018    WISDOM TOOTH EXTRACTION         Family History   Problem Relation Age of Onset    Hypertension Mother     Stroke Maternal Uncle        Health Maintenance Summary            Ordered - HEPATITIS C SCREENING (Once) Ordered on 2024      No completion, postpone, or frequency change history exists for this topic.              Ordered - PAP SMEAR (Every 3 Years) Ordered on 8/15/2024      2024  Postponed until 2024 by Aisha Ace MD (Pending event)              Postponed - COVID-19 Vaccine (4 - 2023-24 season) Postponed until 2024      08/15/2024  Postponed until 2024 by Aisha Ace MD (Product Unavailable)    2024  Postponed until 7/15/2024 by Aisha Ace MD (Product Unavailable)    2021  Imm Admin: COVID-19 (PFIZER) Purple Cap Monovalent    2021  Imm Admin: COVID-19 (PFIZER) PURPLE CAP    2021  Imm Admin: COVID-19 (PFIZER) PURPLE CAP    Only the first 5 history entries have been loaded, but more history exists.              Postponed - INFLUENZA VACCINE (Yearly - August to March) Postponed until 3/31/2025      08/15/2024  Postponed until 3/31/2025 by Aisha Ace MD (Product Unavailable)    2022  Outside Immunization: Flu MDCK Quad P-Free Inj              ANNUAL PHYSICAL (Yearly) Next due on 2024  " Done              BMI FOLLOWUP (Yearly) Next due on 8/15/2025      08/15/2024  SmartData: BMI EDUCATION FOR OVERWEIGHT    04/25/2024  Postponed until 4/26/2024 by Aisha Ace MD (Pending event)              TDAP/TD VACCINES (2 - Td or Tdap) Next due on 9/14/2032 09/14/2022  Outside Immunization: Tdap, Adsorbed              Pneumococcal Vaccine 0-64 (Series Information) Aged Out      No completion, postpone, or frequency change history exists for this topic.                     Objective   Vitals:    08/15/24 0941   BP: 107/76   Pulse: 82   SpO2: 97%   Weight: 69.4 kg (153 lb)   Height: 157.5 cm (62.01\")      Physical exam  HEENT normal. PERRLA. MMM. Neck supple. Normal respiratory effort. Well perfused. Abdomen soft. Extremities show no edema, normal range of motion. Normal gait, no focal neurological findings.    BREAST EXAM: breasts appear normal, no suspicious masses, no skin or nipple changes or axillary nodes, risk and benefit of breast self-exam was discussed, not examined    PELVIC EXAM: VULVA: normal appearing vulva with no masses, tenderness or lesions, VAGINA: normal appearing vagina with normal color and discharge, no lesions, CERVIX: ectropion superior cervix over os, cervical discharge present - white, copious, creamy, odorless, and thick; ThinPrep sample collected.  Exam chaperoned by Krystal medical assistant    The following data was reviewed by: Aisha Ace MD on 08/15/2024:  CMP          4/25/2024    09:33   CMP   Glucose 86    BUN 10    Creatinine 0.67  C   Sodium 142    Potassium 4.0    Chloride 106    Calcium 9.1    Total Protein 7.2    Albumin 4.4    Globulin 2.8    Total Bilirubin 0.6    Alkaline Phosphatase 63    AST (SGOT) 14    ALT (SGPT) 16    BUN/Creatinine Ratio 14.9  C      Details         C Corrected result             CBC          4/25/2024    09:33   CBC   WBC 6.06    RBC 4.88    Hemoglobin 14.5    Hematocrit 43.9    MCV 90.0    MCH 29.7    MCHC 33.0    RDW 12.6  "   Platelets 263      Lipid Panel          4/25/2024    09:33   Lipid Panel   Total Cholesterol 177    Triglycerides 92    HDL Cholesterol 46    VLDL Cholesterol 17    LDL Cholesterol  114        Most Recent A1C          4/25/2024    09:33   HGBA1C Most Recent   Hemoglobin A1C 5.30      Last PCP note      Assessment and Plan  Michelle Norwood is a 34 y.o. female presents to [facility] today for well woman, chart reviewed and updated, medications reviewed, labs reviewed, preventative med reviewed. Pt is currently doing well, no concerning findings on history or exam. Answered all questions at this time, pt expressed no further concerns today.      Preventative medicine:   HIV Screen - N/A  STI screening: N/A  Immunizations UTD: Yes  Anxiety/depression screening: normal  Tobacco cessation counseling: N/A  Pap: Up to date.   Next pap due in 5 years in accordance with ASCCP guidelines if normal today.   Mammogram:  N/A    Osteoporosis Screen:  N/A      Diagnoses and all orders for this visit:    1. Encounter for routine gynecological examination with Papanicolaou smear of cervix (Primary)  -     IGP, Aptima HPV, Rfx 16 / 18,45    2. Vaginal discharge  -     NuSwab VG+, Candida 6sp    3. Psoriasis  Overview:  Dx 2023 by primary, tx with topical hydrocortisone and topical clobetasol. No improvement with topical steroid.  Referred to dermatology, started on topical tacrolimus with improvement in symptoms.          Patient voiced understanding and agreement with plan of care and had no further questions or concerns at this time.     Problems addressed:  new presenting problem: requiring additional assessment, consultation, or diagnostic studies; established condition: Improved  Complexity: labs ordered yes, labs reviewed yes    Aisha Ace MD  Family Medicine  [facility]      Follow Up  Return if symptoms worsen or fail to improve, for Annual physical.    Patient Instructions    Today: Pap and vaginitis swab    Cont treatment from derm.

## 2024-08-20 DIAGNOSIS — B37.31 VULVOVAGINAL CANDIDIASIS: ICD-10-CM

## 2024-08-20 DIAGNOSIS — B37.31 VULVOVAGINAL CANDIDIASIS: Primary | ICD-10-CM

## 2024-08-20 LAB
A VAGINAE DNA VAG QL NAA+PROBE: ABNORMAL SCORE
BVAB2 DNA VAG QL NAA+PROBE: ABNORMAL SCORE
C ALBICANS DNA VAG QL NAA+PROBE: NEGATIVE
C GLABRATA DNA VAG QL NAA+PROBE: NEGATIVE
C KRUSEI DNA VAG QL NAA+PROBE: NEGATIVE
C LUSITANIAE DNA VAG QL NAA+PROBE: NEGATIVE
C TRACH DNA SPEC QL NAA+PROBE: NEGATIVE
CANDIDA DNA VAG QL NAA+PROBE: POSITIVE
MEGA1 DNA VAG QL NAA+PROBE: ABNORMAL SCORE
N GONORRHOEA DNA VAG QL NAA+PROBE: NEGATIVE
T VAGINALIS DNA VAG QL NAA+PROBE: NEGATIVE

## 2024-08-20 RX ORDER — FLUCONAZOLE 150 MG/1
150 TABLET ORAL ONCE
Qty: 1 TABLET | Refills: 0 | Status: SHIPPED | OUTPATIENT
Start: 2024-08-20 | End: 2024-08-20 | Stop reason: SDUPTHER

## 2024-08-20 RX ORDER — FLUCONAZOLE 150 MG/1
150 TABLET ORAL ONCE
Qty: 1 TABLET | Refills: 0 | Status: SHIPPED | OUTPATIENT
Start: 2024-08-20 | End: 2024-08-20

## 2024-08-20 NOTE — TELEPHONE ENCOUNTER
Caller: Michelle Norwood    Relationship: Self    Best call back number: [phone]    Requested Prescriptions:   Requested Prescriptions     Pending Prescriptions Disp Refills    fluconazole (Diflucan) 150 MG tablet 1 tablet 0     Sig: Take 1 tablet by mouth 1 (One) Time for 1 dose.        Pharmacy where request should be sent: [facility] [id number] [address] [place] [address] - [phone] [facility] [phone] FX     Last office visit with prescribing clinician: 8/15/2024   Last telemedicine visit with prescribing clinician: Visit date not found   Next office visit with prescribing clinician: Visit date not found     Additional details provided by patient: PATIENT STATES THIS MEDICATION WAS SENT TO WRONG PHARMACY. PLEASE SEND TO [facility].     Does the patient have less than a 3 day supply:  [x] Yes  [] No    Would you like a call back once the refill request has been completed: [] Yes [x] No    If the office needs to give you a call back, can they leave a voicemail: [] Yes [x] No    Francisco Adams Rep   08/20/24 09:39 EDT

## 2024-08-21 LAB
CYTOLOGIST CVX/VAG CYTO: NORMAL
CYTOLOGY CVX/VAG DOC CYTO: NORMAL
CYTOLOGY CVX/VAG DOC THIN PREP: NORMAL
DX ICD CODE: NORMAL
HPV GENOTYPE REFLEX: NORMAL
HPV I/H RISK 4 DNA CVX QL PROBE+SIG AMP: NEGATIVE
Lab: NORMAL
Lab: NORMAL
OTHER STN SPEC: NORMAL
STAT OF ADQ CVX/VAG CYTO-IMP: NORMAL

## 2024-11-22 ENCOUNTER — HOSPITAL ENCOUNTER (EMERGENCY)
Facility: HOSPITAL | Age: 34
Discharge: HOME | End: 2024-11-22
Attending: EMERGENCY MEDICINE
Payer: COMMERCIAL

## 2024-11-22 VITALS
SYSTOLIC BLOOD PRESSURE: 128 MMHG | HEART RATE: 87 BPM | DIASTOLIC BLOOD PRESSURE: 87 MMHG | TEMPERATURE: 99.1 F | RESPIRATION RATE: 16 BRPM | WEIGHT: 154.4 LBS | HEIGHT: 62 IN | OXYGEN SATURATION: 99 % | BODY MASS INDEX: 28.41 KG/M2

## 2024-11-22 DIAGNOSIS — N81.10 CYSTOCELE, UNSPECIFIED: Primary | ICD-10-CM

## 2024-11-22 LAB
ALBUMIN SERPL-MCNC: 4.3 G/DL (ref 3.5–5.2)
ALBUMIN/GLOB SERPL: 1.4 G/DL
ALP SERPL-CCNC: 59 U/L (ref 39–117)
ALT SERPL W P-5'-P-CCNC: 17 U/L (ref 1–33)
ANION GAP SERPL CALCULATED.3IONS-SCNC: 8.7 MMOL/L (ref 5–15)
AST SERPL-CCNC: 16 U/L (ref 1–32)
B-HCG UR QL: NEGATIVE
BASOPHILS # BLD AUTO: 0.04 10*3/MM3 (ref 0–0.2)
BASOPHILS NFR BLD AUTO: 0.4 % (ref 0–1.5)
BILIRUB SERPL-MCNC: 0.2 MG/DL (ref 0–1.2)
BILIRUB UR QL STRIP: NEGATIVE
BUN SERPL-MCNC: 13 MG/DL (ref 6–20)
BUN/CREAT SERPL: 20.3 (ref 7–25)
CALCIUM SPEC-SCNC: 8.7 MG/DL (ref 8.6–10.5)
CHLORIDE SERPL-SCNC: 105 MMOL/L (ref 98–107)
CLARITY UR: ABNORMAL
CO2 SERPL-SCNC: 24.3 MMOL/L (ref 22–29)
COLOR UR: ABNORMAL
CREAT SERPL-MCNC: 0.64 MG/DL (ref 0.57–1)
DEPRECATED RDW RBC AUTO: 40.9 FL (ref 37–54)
EGFRCR SERPLBLD CKD-EPI 2021: 120.6 ML/MIN/1.73
EOSINOPHIL # BLD AUTO: 0.18 10*3/MM3 (ref 0–0.4)
EOSINOPHIL NFR BLD AUTO: 1.6 % (ref 0.3–6.2)
ERYTHROCYTE [DISTWIDTH] IN BLOOD BY AUTOMATED COUNT: 12.5 % (ref 12.3–15.4)
GLOBULIN UR ELPH-MCNC: 3 GM/DL
GLUCOSE SERPL-MCNC: 83 MG/DL (ref 65–99)
GLUCOSE UR STRIP-MCNC: NEGATIVE MG/DL
HCT VFR BLD AUTO: 44.1 % (ref 34–46.6)
HGB BLD-MCNC: 14.6 G/DL (ref 12–15.9)
HGB UR QL STRIP.AUTO: NEGATIVE
IMM GRANULOCYTES # BLD AUTO: 0.01 10*3/MM3 (ref 0–0.05)
IMM GRANULOCYTES NFR BLD AUTO: 0.1 % (ref 0–0.5)
KETONES UR QL STRIP: NEGATIVE
LEUKOCYTE ESTERASE UR QL STRIP.AUTO: ABNORMAL
LYMPHOCYTES # BLD AUTO: 2.86 10*3/MM3 (ref 0.7–3.1)
LYMPHOCYTES NFR BLD AUTO: 26 % (ref 19.6–45.3)
MCH RBC QN AUTO: 29.4 PG (ref 26.6–33)
MCHC RBC AUTO-ENTMCNC: 33.1 G/DL (ref 31.5–35.7)
MCV RBC AUTO: 88.7 FL (ref 79–97)
MONOCYTES # BLD AUTO: 0.64 10*3/MM3 (ref 0.1–0.9)
MONOCYTES NFR BLD AUTO: 5.8 % (ref 5–12)
NEUTROPHILS NFR BLD AUTO: 66.1 % (ref 42.7–76)
NEUTROPHILS NFR BLD AUTO: 7.29 10*3/MM3 (ref 1.7–7)
NITRITE UR QL STRIP: NEGATIVE
PH UR STRIP.AUTO: 5.5 [PH] (ref 5–8)
PLATELET # BLD AUTO: 253 10*3/MM3 (ref 140–450)
PMV BLD AUTO: 8.9 FL (ref 6–12)
POTASSIUM SERPL-SCNC: 3.5 MMOL/L (ref 3.5–5.2)
PROT SERPL-MCNC: 7.3 G/DL (ref 6–8.5)
PROT UR QL STRIP: NEGATIVE
RBC # BLD AUTO: 4.97 10*6/MM3 (ref 3.77–5.28)
SODIUM SERPL-SCNC: 138 MMOL/L (ref 136–145)
SP GR UR STRIP: 1.01 (ref 1–1.03)
UROBILINOGEN UR QL STRIP: ABNORMAL
WBC NRBC COR # BLD AUTO: 11.02 10*3/MM3 (ref 3.4–10.8)

## 2024-11-22 PROCEDURE — 81003 URINALYSIS AUTO W/O SCOPE: CPT

## 2024-11-22 PROCEDURE — 81025 URINE PREGNANCY TEST: CPT

## 2024-11-22 PROCEDURE — 85025 COMPLETE CBC W/AUTO DIFF WBC: CPT

## 2024-11-22 PROCEDURE — 80053 COMPREHEN METABOLIC PANEL: CPT

## 2024-11-22 PROCEDURE — 99283 EMERGENCY DEPT VISIT LOW MDM: CPT

## 2024-11-22 RX ORDER — SODIUM CHLORIDE 0.9 % (FLUSH) 0.9 %
10 SYRINGE (ML) INJECTION AS NEEDED
Status: DISCONTINUED | OUTPATIENT
Start: 2024-11-22 | End: 2024-11-22 | Stop reason: HOSPADM

## 2024-11-22 NOTE — FSED PROVIDER NOTE
[facility]    EMERGENCY DEPARTMENT ENCOUNTER    Room Number:    Date seen:  2024  Time seen: 18:36 EST  PCP: Aisha Ace MD  Historian: patient    HPI:  Chief complaint:vaginal pain  Context:Michelle Norwood is a 34 y.o. female who presents to the ED with c/o vaginal pain. The patient states that she has been having pain in her vaginal canal. She states that she is able to urinate without difficulty. She states that she has been having some discharge. She denies any abdominal pain, vaginal bleeding, or concerns for stds. Patient states that she had something similar to this when she had her child but it was not this bad. The patient is nontoxic in appearance.     Timing:constant  Duration: yesterday  Intensity/Severity:moderate  Associated Symptoms:vaginal pain      MEDICAL RECORD REVIEW  Irregular periods    ALLERGIES  Patient has no known allergies.    PAST MEDICAL HISTORY  Active Ambulatory Problems     Diagnosis Date Noted    Folliculitis 2021    Psoriasis 2024     Resolved Ambulatory Problems     Diagnosis Date Noted    Irregular periods 2021     premature rupture of membranes in third trimester 10/13/2022    33 weeks gestation of pregnancy 10/14/2022    Postpartum anemia 10/15/2022     No Additional Past Medical History       PAST SURGICAL HISTORY  Past Surgical History:   Procedure Laterality Date     SECTION N/A 10/25/2018    WISDOM TOOTH EXTRACTION         FAMILY HISTORY  Family History   Problem Relation Age of Onset    Hypertension Mother     Stroke Maternal Uncle        SOCIAL HISTORY  Social History     Socioeconomic History    Marital status:    Tobacco Use    Smoking status: Never    Smokeless tobacco: Never   Vaping Use    Vaping status: Never Used   Substance and Sexual Activity    Alcohol use: Yes     Comment: socially    Drug use: Not Currently    Sexual activity: Yes     Partners: Male     Birth  control/protection: I.U.D.       REVIEW OF SYSTEMS  Review of Systems    All systems reviewed and negative except for those discussed in HPI.     PHYSICAL EXAM    I have reviewed the triage vital signs and nursing notes.    ED Triage Vitals [11/22/24 1630]   Temp Heart Rate Resp BP SpO2   99.1 °F (37.3 °C) 87 16 128/87 99 %      Temp src Heart Rate Source Patient Position BP Location FiO2 (%)   Oral Monitor Sitting Right arm --       Physical Exam  Exam conducted with a chaperone present.   Constitutional:       Appearance: Normal appearance. She is not toxic-appearing.   HENT:      Nose: Nose normal.      Mouth/Throat:      Mouth: Mucous membranes are moist.   Eyes:      Pupils: Pupils are equal, round, and reactive to light.   Cardiovascular:      Rate and Rhythm: Normal rate and regular rhythm.      Pulses: Normal pulses.      Heart sounds: Normal heart sounds.   Pulmonary:      Effort: Pulmonary effort is normal.      Breath sounds: Normal breath sounds.   Genitourinary:     Comments: Small cystocele noted  Musculoskeletal:         General: Normal range of motion.   Skin:     General: Skin is warm.   Neurological:      General: No focal deficit present.      Mental Status: She is alert.   Psychiatric:         Mood and Affect: Mood normal.         Behavior: Behavior normal.         Vital signs and nursing notes reviewed.        LAB RESULTS  Recent Results (from the past 24 hours)   Comprehensive Metabolic Panel    Collection Time: 11/22/24  5:18 PM    Specimen: Blood   Result Value Ref Range    Glucose 83 65 - 99 mg/dL    BUN 13 6 - 20 mg/dL    Creatinine 0.64 0.57 - 1.00 mg/dL    Sodium 138 136 - 145 mmol/L    Potassium 3.5 3.5 - 5.2 mmol/L    Chloride 105 98 - 107 mmol/L    CO2 24.3 22.0 - 29.0 mmol/L    Calcium 8.7 8.6 - 10.5 mg/dL    Total Protein 7.3 6.0 - 8.5 g/dL    Albumin 4.3 3.5 - 5.2 g/dL    ALT (SGPT) 17 1 - 33 U/L    AST (SGOT) 16 1 - 32 U/L    Alkaline Phosphatase 59 39 - 117 U/L    Total Bilirubin  0.2 0.0 - 1.2 mg/dL    Globulin 3.0 gm/dL    A/G Ratio 1.4 g/dL    BUN/Creatinine Ratio 20.3 7.0 - 25.0    Anion Gap 8.7 5.0 - 15.0 mmol/L    eGFR 120.6 >60.0 mL/min/1.73   CBC Auto Differential    Collection Time: 11/22/24  5:18 PM    Specimen: Blood   Result Value Ref Range    WBC 11.02 (H) 3.40 - 10.80 10*3/mm3    RBC 4.97 3.77 - 5.28 10*6/mm3    Hemoglobin 14.6 12.0 - 15.9 g/dL    Hematocrit 44.1 34.0 - 46.6 %    MCV 88.7 79.0 - 97.0 fL    MCH 29.4 26.6 - 33.0 pg    MCHC 33.1 31.5 - 35.7 g/dL    RDW 12.5 12.3 - 15.4 %    RDW-SD 40.9 37.0 - 54.0 fl    MPV 8.9 6.0 - 12.0 fL    Platelets 253 140 - 450 10*3/mm3    Neutrophil % 66.1 42.7 - 76.0 %    Lymphocyte % 26.0 19.6 - 45.3 %    Monocyte % 5.8 5.0 - 12.0 %    Eosinophil % 1.6 0.3 - 6.2 %    Basophil % 0.4 0.0 - 1.5 %    Immature Grans % 0.1 0.0 - 0.5 %    Neutrophils, Absolute 7.29 (H) 1.70 - 7.00 10*3/mm3    Lymphocytes, Absolute 2.86 0.70 - 3.10 10*3/mm3    Monocytes, Absolute 0.64 0.10 - 0.90 10*3/mm3    Eosinophils, Absolute 0.18 0.00 - 0.40 10*3/mm3    Basophils, Absolute 0.04 0.00 - 0.20 10*3/mm3    Immature Grans, Absolute 0.01 0.00 - 0.05 10*3/mm3   Pregnancy, Urine - Urine, Clean Catch    Collection Time: 11/22/24  5:21 PM    Specimen: Urine, Clean Catch   Result Value Ref Range    HCG, Urine QL Negative Negative   Urinalysis without microscopic (no culture) - Urine, Clean Catch    Collection Time: 11/22/24  5:21 PM    Specimen: Urine, Clean Catch   Result Value Ref Range    Color, UA Straw Yellow, Straw    Appearance, UA Slightly Cloudy (A) Clear    pH, UA 5.5 5.0 - 8.0    Specific Gravity, UA 1.010 1.005 - 1.030    Glucose, UA Negative Negative    Ketones, UA Negative Negative    Bilirubin, UA Negative Negative    Blood, UA Negative Negative    Protein, UA Negative Negative    Leuk Esterase, UA Small (1+) (A) Negative    Nitrite, UA Negative Negative    Urobilinogen, UA 0.2 E.U./dL 0.2 - 1.0 E.U./dL       Ordered the above labs and independently  reviewed the results.      RADIOLOGY RESULTS  No Radiology Exams Resulted Within Past 24 Hours       I ordered the above noted radiological studies. Independently reviewed by me and discussed with radiologist.  See dictation above for official radiology interpretation.      Orders placed during this visit:  Orders Placed This Encounter   Procedures    Pregnancy, Urine - Urine, Clean Catch    Urinalysis without microscopic (no culture) - Urine, Clean Catch    Comprehensive Metabolic Panel    CBC Auto Differential    Insert peripheral IV    CBC & Differential    ED Acknowledgement Form Needed;           PROCEDURES    Procedures        MEDICATIONS GIVEN IN ER    Medications   sodium chloride 0.9 % flush 10 mL (has no administration in time range)         PROGRESS, DATA ANALYSIS, CONSULTS, AND MEDICAL DECISION MAKING    All labs and radiology studies have been independently reviewed by me.          AS OF 18:41 EST VITALS:    BP - 128/87  HR - 87  TEMP - 99.1 °F (37.3 °C) (Oral)  02 SATS - 99%    Medical Decision Making  Patient is a 34 year old female who presents today with vaginal pain. Upon examination the patient is noted to have a small area of bulging. The patient is able to urinate without difficulty. I did have Dr. JAVON White evaluate the patient as well. He states that this is a small cystocele. The patient was recommended to follow up with her OBGYN. She was given strict return precautions with understanding.     Problems Addressed:  Cystocele, unspecified: complicated acute illness or injury    Amount and/or Complexity of Data Reviewed  Labs: ordered.    Risk  Prescription drug management.          DIAGNOSIS  Final diagnoses:   Cystocele, unspecified       New Medications Ordered This Visit   Medications    sodium chloride 0.9 % flush 10 mL           I performed hand hygiene on entry into the pt room and upon exit.      Part of this note may be an electronic transcription/translation of spoken language to  printed text using the Dragon Dictation System.     Appropriate PPE worn during exam.    Dictated utilizing Dragon dictation     Note Disclaimer: At [facility], we believe that sharing information builds trust and better relationships. You are receiving this note because you recently visited [facility]. It is possible you will see health information before a provider has talked with you about it. This kind of information can be easy to misunderstand. To help you fully understand what it means for your health, we urge you to discuss this note with your provider.

## 2024-11-22 NOTE — DISCHARGE INSTRUCTIONS
Please follow up with your OBGYN and your primary care provider. Return to the emergency room for any new or worsening symptom.

## 2025-07-09 ENCOUNTER — HOSPITAL ENCOUNTER (EMERGENCY)
Facility: HOSPITAL | Age: 35
Discharge: HOME | End: 2025-07-09
Attending: OBSTETRICS & GYNECOLOGY | Admitting: OBSTETRICS & GYNECOLOGY
Payer: COMMERCIAL

## 2025-07-09 VITALS — HEART RATE: 107 BPM | TEMPERATURE: 98.4 F | SYSTOLIC BLOOD PRESSURE: 122 MMHG | DIASTOLIC BLOOD PRESSURE: 69 MMHG

## 2025-07-09 LAB
BACTERIA UR QL AUTO: ABNORMAL /HPF
BASOPHILS # BLD AUTO: 0.03 10*3/MM3 (ref 0–0.2)
BASOPHILS NFR BLD AUTO: 0.3 % (ref 0–1.5)
BILIRUB UR QL STRIP: NEGATIVE
BILIRUB UR QL STRIP: NEGATIVE
CLARITY UR: ABNORMAL
CLARITY UR: ABNORMAL
COLOR UR: YELLOW
COLOR UR: YELLOW
DEPRECATED RDW RBC AUTO: 44.3 FL (ref 37–54)
EOSINOPHIL # BLD AUTO: 0.08 10*3/MM3 (ref 0–0.4)
EOSINOPHIL NFR BLD AUTO: 0.7 % (ref 0.3–6.2)
ERYTHROCYTE [DISTWIDTH] IN BLOOD BY AUTOMATED COUNT: 13.1 % (ref 12.3–15.4)
FETAL RBC/RBC BLD FC-RTO: 0 %
GLUCOSE UR STRIP-MCNC: NEGATIVE MG/DL
GLUCOSE UR STRIP-MCNC: NEGATIVE MG/DL
HCT VFR BLD AUTO: 40.2 % (ref 34–46.6)
HGB BLD-MCNC: 13.2 G/DL (ref 12–15.9)
HGB F BLD QL KLEIH BETKE: NORMAL
HGB UR QL STRIP.AUTO: NEGATIVE
HGB UR QL STRIP.AUTO: NEGATIVE
HYALINE CASTS UR QL AUTO: ABNORMAL /LPF
IMM GRANULOCYTES # BLD AUTO: 0.09 10*3/MM3 (ref 0–0.05)
IMM GRANULOCYTES NFR BLD AUTO: 0.8 % (ref 0–0.5)
KETONES UR QL STRIP: ABNORMAL
KETONES UR QL STRIP: ABNORMAL
LEUKOCYTE ESTERASE UR QL STRIP.AUTO: ABNORMAL
LEUKOCYTE ESTERASE UR QL STRIP.AUTO: ABNORMAL
LYMPHOCYTES # BLD AUTO: 1.7 10*3/MM3 (ref 0.7–3.1)
LYMPHOCYTES NFR BLD AUTO: 14.5 % (ref 19.6–45.3)
MCH RBC QN AUTO: 30.8 PG (ref 26.6–33)
MCHC RBC AUTO-ENTMCNC: 32.8 G/DL (ref 31.5–35.7)
MCV RBC AUTO: 93.9 FL (ref 79–97)
MONOCYTES # BLD AUTO: 0.54 10*3/MM3 (ref 0.1–0.9)
MONOCYTES NFR BLD AUTO: 4.6 % (ref 5–12)
NEUTROPHILS NFR BLD AUTO: 79.1 % (ref 42.7–76)
NEUTROPHILS NFR BLD AUTO: 9.3 10*3/MM3 (ref 1.7–7)
NITRITE UR QL STRIP: NEGATIVE
NITRITE UR QL STRIP: NEGATIVE
NRBC BLD AUTO-RTO: 0 /100 WBC (ref 0–0.2)
PH UR STRIP.AUTO: 7 [PH] (ref 5–8)
PH UR STRIP.AUTO: 7 [PH] (ref 5–8)
PLATELET # BLD AUTO: 222 10*3/MM3 (ref 140–450)
PMV BLD AUTO: 8.9 FL (ref 6–12)
PROT UR QL STRIP: NEGATIVE
PROT UR QL STRIP: NEGATIVE
RBC # BLD AUTO: 4.28 10*6/MM3 (ref 3.77–5.28)
RBC # UR STRIP: ABNORMAL /HPF
REF LAB TEST METHOD: ABNORMAL
SP GR UR STRIP: 1.01 (ref 1–1.03)
SP GR UR STRIP: 1.01 (ref 1–1.03)
SQUAMOUS #/AREA URNS HPF: ABNORMAL /HPF
UROBILINOGEN UR QL STRIP: ABNORMAL
UROBILINOGEN UR QL STRIP: ABNORMAL
WBC # UR STRIP: ABNORMAL /HPF
WBC NRBC COR # BLD AUTO: 11.74 10*3/MM3 (ref 3.4–10.8)
YEAST URNS QL MICRO: PRESENT /HPF

## 2025-07-09 PROCEDURE — 85025 COMPLETE CBC W/AUTO DIFF WBC: CPT | Performed by: OBSTETRICS & GYNECOLOGY

## 2025-07-09 PROCEDURE — 85460 HEMOGLOBIN FETAL: CPT | Performed by: OBSTETRICS & GYNECOLOGY

## 2025-07-09 PROCEDURE — 87077 CULTURE AEROBIC IDENTIFY: CPT | Performed by: OBSTETRICS & GYNECOLOGY

## 2025-07-09 PROCEDURE — 81001 URINALYSIS AUTO W/SCOPE: CPT | Performed by: OBSTETRICS & GYNECOLOGY

## 2025-07-09 PROCEDURE — 99284 EMERGENCY DEPT VISIT MOD MDM: CPT | Performed by: OBSTETRICS & GYNECOLOGY

## 2025-07-09 PROCEDURE — 87186 SC STD MICRODIL/AGAR DIL: CPT | Performed by: OBSTETRICS & GYNECOLOGY

## 2025-07-09 PROCEDURE — 87086 URINE CULTURE/COLONY COUNT: CPT | Performed by: OBSTETRICS & GYNECOLOGY

## 2025-07-09 RX ORDER — PRENATAL VIT NO.126/IRON/FOLIC 28MG-0.8MG
TABLET ORAL DAILY
COMMUNITY

## 2025-07-09 RX ORDER — CEPHALEXIN 500 MG/1
500 CAPSULE ORAL 3 TIMES DAILY
Qty: 21 CAPSULE | Refills: 0 | Status: CANCELLED | OUTPATIENT
Start: 2025-07-09 | End: 2025-07-16

## 2025-07-09 RX ORDER — ASPIRIN 81 MG/1
81 TABLET ORAL DAILY
COMMUNITY
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
## 2025-07-11 LAB — BACTERIA SPEC AEROBE CULT: ABNORMAL

## 2025-08-22 ENCOUNTER — TRANSCRIBE ORDERS (OUTPATIENT)
Dept: DIABETES SERVICES | Facility: HOSPITAL | Age: 35
End: 2025-08-22
Payer: COMMERCIAL

## 2025-08-22 DIAGNOSIS — O24.410 DIET CONTROLLED GESTATIONAL DIABETES MELLITUS (GDM), ANTEPARTUM: Primary | ICD-10-CM

## 2025-08-25 ENCOUNTER — HOSPITAL ENCOUNTER (OUTPATIENT)
Dept: DIABETES SERVICES | Facility: HOSPITAL | Age: 35
Discharge: HOME | End: 2025-08-25
Payer: COMMERCIAL

## 2025-08-25 PROCEDURE — G0108 DIAB MANAGE TRN  PER INDIV: HCPCS
